# Patient Record
Sex: FEMALE | Race: WHITE | Employment: OTHER | URBAN - METROPOLITAN AREA
[De-identification: names, ages, dates, MRNs, and addresses within clinical notes are randomized per-mention and may not be internally consistent; named-entity substitution may affect disease eponyms.]

---

## 2019-01-18 ENCOUNTER — HOSPITAL ENCOUNTER (INPATIENT)
Age: 84
LOS: 3 days | Discharge: NURSING, REHAB OR LONG-TERM CARE | DRG: 065 | End: 2019-01-21
Attending: EMERGENCY MEDICINE | Admitting: INTERNAL MEDICINE
Payer: MEDICARE

## 2019-01-18 DIAGNOSIS — I63.9 CEREBROVASCULAR ACCIDENT (CVA), UNSPECIFIED MECHANISM (HCC): Primary | ICD-10-CM

## 2019-01-18 DIAGNOSIS — I63.9 OCCIPITAL STROKE (HCC): ICD-10-CM

## 2019-01-18 DIAGNOSIS — H53.462 QUADRANTANOPIA OF LEFT EYE: ICD-10-CM

## 2019-01-18 PROBLEM — I50.9 CHF (CONGESTIVE HEART FAILURE) (HCC): Status: ACTIVE | Noted: 2019-01-18

## 2019-01-18 PROCEDURE — 99284 EMERGENCY DEPT VISIT MOD MDM: CPT

## 2019-01-18 PROCEDURE — 74011250637 HC RX REV CODE- 250/637: Performed by: EMERGENCY MEDICINE

## 2019-01-18 PROCEDURE — 65660000000 HC RM CCU STEPDOWN

## 2019-01-18 RX ORDER — CARVEDILOL 3.12 MG/1
3.12 TABLET ORAL 2 TIMES DAILY WITH MEALS
COMMUNITY

## 2019-01-18 RX ORDER — FUROSEMIDE 20 MG/1
20 TABLET ORAL DAILY
COMMUNITY

## 2019-01-18 RX ORDER — ASPIRIN 81 MG/1
81 TABLET ORAL DAILY
COMMUNITY
End: 2019-01-21

## 2019-01-18 RX ORDER — LISINOPRIL 2.5 MG/1
2.5 TABLET ORAL DAILY
COMMUNITY
End: 2019-01-21

## 2019-01-18 RX ORDER — ASPIRIN 325 MG
325 TABLET ORAL ONCE
Status: COMPLETED | OUTPATIENT
Start: 2019-01-18 | End: 2019-01-18

## 2019-01-18 RX ADMIN — ASPIRIN 325 MG: 325 TABLET, COATED ORAL at 22:24

## 2019-01-19 ENCOUNTER — APPOINTMENT (OUTPATIENT)
Dept: MRI IMAGING | Age: 84
DRG: 065 | End: 2019-01-19
Attending: INTERNAL MEDICINE
Payer: MEDICARE

## 2019-01-19 LAB
ANION GAP SERPL CALC-SCNC: 10 MMOL/L (ref 5–15)
BASOPHILS # BLD: 0 K/UL (ref 0–0.1)
BASOPHILS NFR BLD: 1 % (ref 0–1)
BUN SERPL-MCNC: 14 MG/DL (ref 6–20)
BUN/CREAT SERPL: 17 (ref 12–20)
CALCIUM SERPL-MCNC: 8.9 MG/DL (ref 8.5–10.1)
CHLORIDE SERPL-SCNC: 108 MMOL/L (ref 97–108)
CHOLEST SERPL-MCNC: 136 MG/DL
CO2 SERPL-SCNC: 25 MMOL/L (ref 21–32)
CREAT SERPL-MCNC: 0.81 MG/DL (ref 0.55–1.02)
DIFFERENTIAL METHOD BLD: ABNORMAL
EOSINOPHIL # BLD: 0.2 K/UL (ref 0–0.4)
EOSINOPHIL NFR BLD: 4 % (ref 0–7)
ERYTHROCYTE [DISTWIDTH] IN BLOOD BY AUTOMATED COUNT: 14.2 % (ref 11.5–14.5)
EST. AVERAGE GLUCOSE BLD GHB EST-MCNC: NORMAL MG/DL
GLUCOSE SERPL-MCNC: 93 MG/DL (ref 65–100)
HBA1C MFR BLD: 4.8 % (ref 4.2–6.3)
HCT VFR BLD AUTO: 36.5 % (ref 35–47)
HDLC SERPL-MCNC: 50 MG/DL
HDLC SERPL: 2.7 {RATIO} (ref 0–5)
HGB BLD-MCNC: 11.9 G/DL (ref 11.5–16)
IMM GRANULOCYTES # BLD AUTO: 0 K/UL (ref 0–0.04)
IMM GRANULOCYTES NFR BLD AUTO: 0 % (ref 0–0.5)
LDLC SERPL CALC-MCNC: 72.6 MG/DL (ref 0–100)
LIPID PROFILE,FLP: NORMAL
LYMPHOCYTES # BLD: 0.5 K/UL (ref 0.8–3.5)
LYMPHOCYTES NFR BLD: 11 % (ref 12–49)
MCH RBC QN AUTO: 31.3 PG (ref 26–34)
MCHC RBC AUTO-ENTMCNC: 32.6 G/DL (ref 30–36.5)
MCV RBC AUTO: 96.1 FL (ref 80–99)
MONOCYTES # BLD: 0.5 K/UL (ref 0–1)
MONOCYTES NFR BLD: 11 % (ref 5–13)
NEUTS SEG # BLD: 3.3 K/UL (ref 1.8–8)
NEUTS SEG NFR BLD: 73 % (ref 32–75)
NRBC # BLD: 0 K/UL (ref 0–0.01)
NRBC BLD-RTO: 0 PER 100 WBC
PLATELET # BLD AUTO: 121 K/UL (ref 150–400)
PMV BLD AUTO: 10.1 FL (ref 8.9–12.9)
POTASSIUM SERPL-SCNC: 4.1 MMOL/L (ref 3.5–5.1)
RBC # BLD AUTO: 3.8 M/UL (ref 3.8–5.2)
RBC MORPH BLD: ABNORMAL
SODIUM SERPL-SCNC: 143 MMOL/L (ref 136–145)
TRIGL SERPL-MCNC: 67 MG/DL (ref ?–150)
VLDLC SERPL CALC-MCNC: 13.4 MG/DL
WBC # BLD AUTO: 4.5 K/UL (ref 3.6–11)

## 2019-01-19 PROCEDURE — 97161 PT EVAL LOW COMPLEX 20 MIN: CPT

## 2019-01-19 PROCEDURE — 74011250636 HC RX REV CODE- 250/636: Performed by: INTERNAL MEDICINE

## 2019-01-19 PROCEDURE — 65660000000 HC RM CCU STEPDOWN

## 2019-01-19 PROCEDURE — 97535 SELF CARE MNGMENT TRAINING: CPT

## 2019-01-19 PROCEDURE — 70551 MRI BRAIN STEM W/O DYE: CPT

## 2019-01-19 PROCEDURE — 74011250637 HC RX REV CODE- 250/637: Performed by: INTERNAL MEDICINE

## 2019-01-19 PROCEDURE — 93880 EXTRACRANIAL BILAT STUDY: CPT

## 2019-01-19 PROCEDURE — 80048 BASIC METABOLIC PNL TOTAL CA: CPT

## 2019-01-19 PROCEDURE — 36415 COLL VENOUS BLD VENIPUNCTURE: CPT

## 2019-01-19 PROCEDURE — 70544 MR ANGIOGRAPHY HEAD W/O DYE: CPT

## 2019-01-19 PROCEDURE — 85025 COMPLETE CBC W/AUTO DIFF WBC: CPT

## 2019-01-19 PROCEDURE — 97112 NEUROMUSCULAR REEDUCATION: CPT

## 2019-01-19 PROCEDURE — 80061 LIPID PANEL: CPT

## 2019-01-19 PROCEDURE — 83036 HEMOGLOBIN GLYCOSYLATED A1C: CPT

## 2019-01-19 PROCEDURE — 97165 OT EVAL LOW COMPLEX 30 MIN: CPT

## 2019-01-19 RX ORDER — CARVEDILOL 3.12 MG/1
3.12 TABLET ORAL 2 TIMES DAILY WITH MEALS
Status: DISCONTINUED | OUTPATIENT
Start: 2019-01-19 | End: 2019-01-21 | Stop reason: HOSPADM

## 2019-01-19 RX ORDER — ENOXAPARIN SODIUM 100 MG/ML
40 INJECTION SUBCUTANEOUS EVERY 24 HOURS
Status: DISCONTINUED | OUTPATIENT
Start: 2019-01-19 | End: 2019-01-21 | Stop reason: HOSPADM

## 2019-01-19 RX ORDER — SODIUM CHLORIDE 0.9 % (FLUSH) 0.9 %
5-40 SYRINGE (ML) INJECTION EVERY 8 HOURS
Status: DISCONTINUED | OUTPATIENT
Start: 2019-01-19 | End: 2019-01-21 | Stop reason: HOSPADM

## 2019-01-19 RX ORDER — SODIUM CHLORIDE 0.9 % (FLUSH) 0.9 %
5-40 SYRINGE (ML) INJECTION AS NEEDED
Status: DISCONTINUED | OUTPATIENT
Start: 2019-01-19 | End: 2019-01-21 | Stop reason: HOSPADM

## 2019-01-19 RX ORDER — ASPIRIN 325 MG
325 TABLET ORAL DAILY
Status: DISCONTINUED | OUTPATIENT
Start: 2019-01-19 | End: 2019-01-20

## 2019-01-19 RX ORDER — ATORVASTATIN CALCIUM 10 MG/1
20 TABLET, FILM COATED ORAL
Status: DISCONTINUED | OUTPATIENT
Start: 2019-01-19 | End: 2019-01-21 | Stop reason: HOSPADM

## 2019-01-19 RX ORDER — FUROSEMIDE 20 MG/1
20 TABLET ORAL DAILY
Status: DISCONTINUED | OUTPATIENT
Start: 2019-01-19 | End: 2019-01-21 | Stop reason: HOSPADM

## 2019-01-19 RX ORDER — LISINOPRIL 5 MG/1
2.5 TABLET ORAL DAILY
Status: DISCONTINUED | OUTPATIENT
Start: 2019-01-19 | End: 2019-01-20

## 2019-01-19 RX ORDER — ACETAMINOPHEN 650 MG/1
650 SUPPOSITORY RECTAL
Status: DISCONTINUED | OUTPATIENT
Start: 2019-01-19 | End: 2019-01-19

## 2019-01-19 RX ORDER — ACETAMINOPHEN 325 MG/1
650 TABLET ORAL
Status: DISCONTINUED | OUTPATIENT
Start: 2019-01-19 | End: 2019-01-21 | Stop reason: HOSPADM

## 2019-01-19 RX ADMIN — Medication 10 ML: at 20:56

## 2019-01-19 RX ADMIN — FUROSEMIDE 20 MG: 40 TABLET ORAL at 09:14

## 2019-01-19 RX ADMIN — ACETAMINOPHEN 650 MG: 325 TABLET ORAL at 23:32

## 2019-01-19 RX ADMIN — CARVEDILOL 3.12 MG: 6.25 TABLET, FILM COATED ORAL at 16:38

## 2019-01-19 RX ADMIN — ASPIRIN 325 MG: 325 TABLET, COATED ORAL at 09:13

## 2019-01-19 RX ADMIN — ATORVASTATIN CALCIUM 20 MG: 10 TABLET, FILM COATED ORAL at 20:53

## 2019-01-19 RX ADMIN — LISINOPRIL 2.5 MG: 5 TABLET ORAL at 09:13

## 2019-01-19 RX ADMIN — CARVEDILOL 3.12 MG: 6.25 TABLET, FILM COATED ORAL at 09:14

## 2019-01-19 RX ADMIN — ENOXAPARIN SODIUM 40 MG: 40 INJECTION SUBCUTANEOUS at 09:15

## 2019-01-19 NOTE — PROGRESS NOTES
Bedside shift change report given to Ferny Vines RN (oncoming nurse) by Emma Hart RN (offgoing nurse). Report included the following information SBAR, Kardex and MAR.

## 2019-01-19 NOTE — PROGRESS NOTES
Problem: Self Care Deficits Care Plan (Adult) Goal: *Acute Goals and Plan of Care (Insert Text) Occupational Therapy Goals Initiated 1/19/2019 1. Patient will perform grooming standing at the sink with supervision/set-up within 7 day(s). 2.  Patient will perform self-feeding using the left hand as a fine assist to open containers with modified independence within 7 day(s). 3.  Patient will perform lower body dressing with modified independence within 7 day(s). 4.  Patient will perform toilet transfers with supervision/set-up within 7 day(s). 5.  Patient will perform all aspects of toileting with modified independence within 7 day(s). 6.  Patient will participate in upper extremity therapeutic exercise/activities with supervision/set-up within 7 day(s). 7.  Patient will utilize energy conservation techniques during functional activities with verbal cues within 7 day(s). Occupational Therapy EVALUATION Patient: Trey Ferrer (80 y.o. female) Date: 1/19/2019 Primary Diagnosis: CVA (cerebral vascular accident) ([facility] CVA (cerebral vascular accident) ([facility] Precautions:   Fall ASSESSMENT : 
Based on the objective data described below, the patient presents with L UE dysmetria, decreased L UE proprioception, impaired L UE motor planning and poor standing balance with ataxic gait pattern creating high fall risk during functional transfers and standing ADLs. Pt is functioning below her baseline (pt IND at home; still driving; swims and works out at [employer]; active in [employer]) and now requires up to minimal assistance for standing ADLs, functional transfers and use of the L hand during ADLs and FM tasks. Pt is highly motivated to increase her function and will be an excellent IP rehab candidate. . 
 
Patient will benefit from skilled intervention to address the above impairments. Patients rehabilitation potential is considered to be Excellent Factors which may influence rehabilitation potential include:  
[x]                None noted []                Mental ability/status []                Medical condition []                Home/family situation and support systems []                Safety awareness []                Pain tolerance/management 
[]                Other: PLAN : 
Recommendations and Planned Interventions: 
[x]                  Self Care Training                  [x]           Therapeutic Activities [x]                  Functional Mobility Training    []           Cognitive Retraining 
[x]                  Therapeutic Exercises           []           Endurance Activities [x]                  Balance Training                   [x]           Neuromuscular Re-Education []                  Visual/Perceptual Training     [x]      Home Safety Training 
[x]                  Patient Education                 [x]           Family Training/Education []                  Other (comment): Frequency/Duration: Patient will be followed by occupational therapy 5 times a week to address goals. Discharge Recommendations: Rehab Further Equipment Recommendations for Discharge: None SUBJECTIVE:  
Patient stated Wilton thayer for your help. Tawanna Silva OBJECTIVE DATA SUMMARY:  
HISTORY:  
History reviewed. No pertinent past medical history. History reviewed. No pertinent surgical history. Prior Level of Function/Environment/Context: Pt lives in a 2 story home with first floor setup but is not restricted from going up the stairs at baseline. She is IND with ADLs and mobility, drives, active in [employer] and goes to the gym for swim classes 3 times per week and aerobics 2  Per week. Has an extensive and close knit \"[employer] family\" who can provide assistance at home.    
Occupations in which the patient is/was successful, what are the barriers preventing that success:  
Performance Patterns (routines, roles, habits, and rituals):  
 Personal Interests and/or values:  
Expanded or extensive additional review of patient history:  
 
Home Situation Home Environment: Private residence # Steps to Enter: 1 Rails to Enter: Yes One/Two Story Residence: Two story, live on 1st floor Living Alone: Yes Support Systems: Friends \ neighbors Patient Expects to be Discharged to[de-identified] Rehabilitation facility Current DME Used/Available at Home: Cane, straight Tub or Shower Type: Shower(grab bar;  ) Hand dominance: RightEXAMINATION OF PERFORMANCE DEFICITS: 
Cognitive/Behavioral Status: 
Neurologic State: Alert; Appropriate for age Orientation Level: Appropriate for age Cognition: Appropriate decision making; Appropriate for age attention/concentration; Appropriate safety awareness; Follows commands Perception: Appears intact Safety/Judgement: Insight into deficits Skin: intact Edema: None noted Hearing: Auditory Auditory Impairment: Hard of hearing, bilateral 
Vision/Perceptual:   
       
Acuity: Able to read clock/calendar on wall without difficulty Corrective Lenses: Glasses Range of Motion: 
AROM: Generally decreased, functional(R UE limited at baseline; L UE WNL) Strength: 
Strength: Generally decreased, functional 
  
 
Coordination: 
Coordination: Generally decreased, functional 
Fine Motor Skills-Upper: Left Impaired;Right Intact Gross Motor Skills-Upper: Left Impaired;Right Intact Tone & Sensation: 
Tone: Normal 
Sensation: Intact Balance: 
Sitting: Intact Standing: Impaired Standing - Static: Fair Standing - Dynamic : Poor Functional Mobility and Transfers for ADLs:Bed Mobility: 
Supine to Sit: Supervision Transfers: 
Sit to Stand: Minimum assistance Functional Transfers Toilet Transfer : Minimum assistance Adaptive Equipment: Bedside commode Bed to Chair: Minimum assistance ADL Assessment: 
Feeding: Setup Oral Facial Hygiene/Grooming: Setup Bathing: Minimum assistance Upper Body Dressing: Minimum assistance Lower Body Dressing: Minimum assistance Toileting: Minimum assistance ADL Intervention and task modifications: 
  
Grooming Washing Hands: Supervision/set-up Lower Body Dressing Assistance Socks: Supervision/set-up Leg Crossed Method Used: Yes Position Performed: Seated edge of bed Toileting Toileting Assistance: Minimum assistance Bladder Hygiene: Minimum assistance Cognitive Retraining Safety/Judgement: Insight into deficits Functional Measure:  
Fugl-Bustos Assessment of Motor Recovery after Stroke:  
 
Reflex Activity Flexors/Biceps/Fingers: Can be elicited Extensors/Triceps: Can be elicited Reflex Subtotal: 4 Volitional Movement Within Synergies Shoulder Retraction: Full Shoulder Elevation: Full Shoulder Abduction (90 degrees): Full Shoulder External Rotation: Full Elbow Flexion: Full Forearm Supination: Full Shoulder Adduction/Internal Rotation: Full Elbow Extension: Full Forearm Pronation: Full Subtotal: 18 
 
Volitional Movement Mixing Synergies Hand to Lumbar Spine: Full Shoulder Flexion (0-90 degrees): Full Pronation-Supination: Full Subtotal: 6 Volitional Movement With Little or No Synergy Shoulder Abduction (0-90 degrees): Full Shoulder Flexion ( degrees): Full Pronation/Supination: Full Subtotal : 6 Normal Reflex Activity Biceps, Triceps, Finger Flexors: Full Subtotal : 2 Upper Extremity Total  
Upper Extremity Total: 36 Wrist 
Stability at 15 Degree Dorsiflexion: Full Repeated Dorsiflexion/ Volar Flexion: Full Stability at 15 Degree Dorsiflexion: Full Repeated Dorsiflexion/ Volar Flexion: Full Circumduction: Partial 
Wrist Total: 9 Hand Mass Flexion: Full Mass Extension: Full Grasp A: Full Grasp B: Full Grasp C: Full Grasp D: Full Grasp E: Full Hand Total: 14 
 
Coordination/Speed Tremor: None Dysmetria: Slight Time: <1s Coordination/Speed Total : 5 Total A-D 
 Total A-D (Motor Function): 93/38 This is a reliable/valid measure of arm function after a neurological event. It has established value to characterize functional status and for measuring spontaneous and therapy-induced recovery; tests proximal and distal motor functions. Fugl-Bustos Assessment  UE scores recorded between five and 30 days post neurologic event can be used to predict UE recovery at six months post neurologic event. Severe = 0-21 points Moderately Severe = 22-33 points Moderate = 34-47 points Mild = 48-66 points AINSLEY Beasley, HIRAM Terrazas, & HOLDEN Turner (1992). Measurement of motor recovery after stroke: Outcome assessment and sample size requirements. Stroke, 23, pp. 6267-0286.  
------------------------------------------------------------------------------------------------------------------------------------------------------------------MCID: 
Stroke: Ceferino Lugo et al, 2001; n = 171; mean age 79 (6) years; assessed within 16 (12) days of stroke, Acute Stroke) FMA Motor Scores from Admission to Discharge 10 point increase in FMA Upper Extremity = 1.5 change in discharge FIM  
10 point increase in FMA Lower Extremity = 1.9 change in discharge FIM 
MDC:  
Stroke:  
Vanessa Ray et al, 2008, n = 14, mean age = 59.9 (14.6) years, assessed on average 14 (6.5) months post stroke, Chronic Stroke) FMA = 5.2 points for the Upper Extremity portion of the assessment Occupational Therapy Evaluation Charge Determination History Examination Decision-Making LOW Complexity : Brief history review  LOW Complexity : 1-3 performance deficits relating to physical, cognitive , or psychosocial skils that result in activity limitations and / or participation restrictions  LOW Complexity : No comorbidities that affect functional and no verbal or physical assistance needed to complete eval tasks Based on the above components, the patient evaluation is determined to be of the following complexity level: LOW Pain: 
Pain Scale 1: Numeric (0 - 10) Pain Intensity 1: 0 Activity Tolerance:  
No s/s of distress Please refer to the flowsheet for vital signs taken during this treatment. After treatment:  
[x]  Patient left in no apparent distress sitting up in chair 
[]  Patient left in no apparent distress in bed 
[x]  Call bell left within reach [x]  Nursing notified 
[]  Caregiver present 
[]  Bed alarm activated COMMUNICATION/EDUCATION:  
The patients plan of care was discussed with: Physical Therapist and Registered Nurse. Patient was educated regarding her deficit(s) of L dysmetria as this relates to her diagnosis of CVA. She demonstrated Excellent understanding as evidenced by verbalization. Patient and/or family was verbally educated on the BE FAST acronym for signs/symptoms of CVA and TIA. Informed patient to refer to the Stroke Binder for further BE FAST information. All questions answered with patient indicating good understanding. []    Home safety education was provided and the patient/caregiver indicated understanding. [x]    Patient/family have participated as able in goal setting and plan of care. [x]    Patient/family agree to work toward stated goals and plan of care. []    Patient understands intent and goals of therapy, but is neutral about his/her participation. []    Patient is unable to participate in goal setting and plan of care. This patients plan of care is appropriate for delegation to SAV. Thank you for this referral. 
Edgard Hogan OT Time Calculation: 38 mins

## 2019-01-19 NOTE — PROGRESS NOTES
Bedside shift change report given to Pranay Oliva RN (oncoming nurse) by Kinjal Orozco RN (offgoing nurse). Report included the following information SBAR, Kardex and MAR.

## 2019-01-19 NOTE — PROCEDURES
[facility] [address]  *** FINAL REPORT ***    Name: Antolin Queen  MRN: [id number]  : 1929  HIS Order #: [id number]  [address] [facility] Visit #: [id number]  Date: 2019    TYPE OF TEST: Cerebrovascular Duplex    REASON FOR TEST  Transient ischemic attacks    Right Carotid:-             Proximal               Mid                 Distal  cm/s  Systolic  Diastolic  Systolic  Diastolic  Systolic  Diastolic  CCA:     03.6      17.0                            71.0      17.0  Bulb:  ECA:     49.0      10.0  ICA:     49.0       8.0       42.0      13.0       45.0      10.0  ICA/CCA:  0.7       0.5    ICA Stenosis: <50%    Right Vertebral:-  Finding: Antegrade  Sys:       34.0  Karley:        9.0    Right Subclavian: Normal    Left Carotid:-            Proximal                Mid                 Distal  cm/s  Systolic  Diastolic  Systolic  Diastolic  Systolic  Diastolic  CCA:     99.4      15.0                            53.0      14.0  Bulb:  ECA:     51.0      11.0  ICA:     45.0       9.0       51.0      11.0       38.0      10.0  ICA/CCA:  0.8       0.6    ICA Stenosis: <50%    Left Vertebral:-  Finding: Antegrade  Sys:       35.0  Karley:       12.0    Left Subclavian: Normal    INTERPRETATION/FINDINGS  PROCEDURE:  Evaluation of the extracranial cerebrovascular arteries  with ultrasound (B-mode imaging, pulsed Doppler, color Doppler). Includes the common carotid, internal carotid, external carotid, and  vertebral arteries. FINDINGS: Heterogenous plaque visualized right CCA/ICA. Moderate  Calcified Plaque visualized left CCA/ICA, unable to obtain high  velocities. IMPRESSION: Findings are consistent with 0-50% stenosis of the right  internal carotid and 0-50% stenosis of the left internal carotid. Vertebrals are patent with antegrade flow. ADDITIONAL COMMENTS    I have personally reviewed the data relevant to the interpretation of  this  study.     TECHNOLOGIST: Margarita Andrews  Signed: 2019 08:07 AM    PHYSICIAN: Rosangela Cunningham.  Bg Carr MD  Signed: 01/21/2019 09:59 AM

## 2019-01-19 NOTE — H&P
[address] 
[phone] Admission History and Physical 
 
 
NAME:  Dallas Mayen :   1929 MRN:  [id number] PCP:  No primary care provider on file. Date/Time:  2019 Subjective: CHIEF COMPLAINT: LT side weakness, blurry vision HISTORY OF PRESENT ILLNESS:    
Ms. Nolan Mayen is a 80 y.o.  female who is admitted with CVA. Ms. Nolan Mayen with PMH of CHF was sent from [facility] due to CVA. [facility] and when she parked her car and when she started to walk towards the restaurant, she dropped her pocket book and later, she collapsed, but didn't loss consciousness. She noted to be weak on the LT side with blurry vision and facial droop. At the hospital, had CT and CTA of the head and offered tPA, but pt refused. As they done't have neurologist at the hospital, she was sent to [facility] ER No past medical history on file. No past surgical history on file. Social History Tobacco Use  Smoking status: Not on file Substance Use Topics  Alcohol use: Not on file Family History Problem Relation Age of Onset  Heart Disease Sister Allergies Allergen Reactions  Erythromycin Unknown (comments)  Sulfa (Sulfonamide Antibiotics) Unknown (comments) Prior to Admission medications Not on File Review of Systems: 
(bold if positive, if negative) Gen:  Eyes:  ENT:  CVS:  Pulm:  GI:   
:   
MS:  Skin:  Psych:  Endo:   
Hem:  Renal:   
Neuro:  weakness,  
 
 
  
Objective: VITALS:   
Vital signs reviewed; most recent are: 
 
Visit Vitals Ht 5' 7\" (1.702 m) Wt 77.1 kg (170 lb) SpO2 93% BMI 26.63 kg/m² SpO2 Readings from Last 6 Encounters:  
19 93% No intake or output data in the 24 hours ending 19 2300 Exam:  
 
Physical Exam: Gen:  Well-developed, well-nourished, in no acute distress HEENT:  Pink conjunctivae, PERRL, hearing intact to voice, moist mucous membranes Neck:  Supple, without masses, thyroid non-tender Resp:  No accessory muscle use, clear breath sounds without wheezes rales or rhonchi 
Card:  No murmurs, normal S1, S2 without thrills, bruits or peripheral edema Abd:  Soft, non-tender, non-distended, normoactive bowel sounds are present Lymph:  No cervical adenopathy Musc:  No cyanosis or clubbing Skin:  No rashes or ulcers, skin turgor is good Neuro:  Cranial nerves 3-12 are grossly intact,  strength is 5/5 bilaterally, dorsi / plantarflexion strength is 5/5 bilaterally, follows commands appropriately Psych:  Alert with good insight. Oriented to person, place, and time Labs: 
 
No results for input(s): WBC, HGB, HCT, PLT, HGBEXT, HCTEXT, PLTEXT in the last 72 hours. No results for input(s): NA, K, CL, CO2, GLU, BUN, CREA, CA, MG, PHOS, ALB, TBIL, TBILI, SGOT, ALT in the last 72 hours. No results found for: Servando Pontiff No results for input(s): PH, PCO2, PO2, HCO3, FIO2 in the last 72 hours. No results for input(s): INR in the last 72 hours. No lab exists for component: INREXT Chest Xray:    
EKG reviewed:   
 
  
Assessment/Plan: 1. [facility] (1/18/2019). CT and CTA of the head at [facility] is unremarkable. Check MRI/ MRA of the brain, BL carotid doppler, echocardiogram. start ASA, neuro check and consult neurology, PT/OT 2. CHF (congestive heart failure) ([facility] (1/18/2019), compensated. recently diagnosed. Unknown EF. Continue coreg, ACEi and lasix. Check echocardiogram.  
 
  
 
 
Medical records reviewed in preparation for this admission: Old medical records. Surrogate decision maker:  patient and son Total time spent with patient: 50 Minutes Care Plan discussed with: Patient, Family, Nursing Staff and >50% of time spent in counseling and coordination of care Discussed:  Care Plan Prophylaxis:  Lovenox Probable Disposition:  Home w/Family 
        
___________________________________________________ Attending Physician: Tiffanie Proctor MD

## 2019-01-19 NOTE — ED NOTES
.Bedside and Verbal shift change report given to Francesca (oncoming nurse) by Sukumar Aleman (offgoing nurse). Report included the following information SBAR, ED Summary, MAR and Recent Results.

## 2019-01-19 NOTE — PROGRESS NOTES
BS[facility]SI: MED RECONCILIATION Information obtained from: patient, patient's family, medication bottles from [facility] pharmacy Significant PMH/Disease States: History reviewed. No pertinent past medical history. Chief Complaint for this Admission: Chief Complaint Patient presents with  Extremity Weakness Allergies: Erythromycin and Sulfa (sulfonamide antibiotics) Prior to Admission Medications:  
 
Medication Documentation Review Audit Reviewed by ELAINE OsorioD (Pharmacist) on 01/18/19 at 0385 [id number] Medication Sig Documenting Provider Last Dose Status Taking?  
aspirin delayed-release 81 mg tablet Take 81 mg by mouth daily. Provider, Historical 1/18/2019 am Active Yes  
carvedilol (COREG) 3.125 mg tablet Take 3.125 mg by mouth two (2) times daily (with meals). Provider, Historical 1/18/2019 afternoon Active Yes  
furosemide (LASIX) 20 mg tablet Take 20 mg by mouth daily. Provider, Historical 1/18/2019 am Active Yes  
lisinopril (PRINIVIL, ZESTRIL) 2.5 mg tablet Take 2.5 mg by mouth daily. Provider, Historical 1/18/2019 am Active Yes Thank you for the consult, 
Dakotah Echeverria

## 2019-01-19 NOTE — ED PROVIDER NOTES
80 y.o. female with past medical history significant for HTN and CHF who presents via EMS as a transfer from [facility] for admission s/p stroke. Pt states she was at a restaurant earlier today, when she dropped her purse on the ground. She reports leaning over to pick it up and subsequently \"slumped to the ground\" [phone] this afternoon, pt denies falling. Per family at bedside, bystanders noted the pt to have \"fallen in slow motion. \" Upon EMS arrival, pt was noted to have left sided extremity weakness and slurred speech. Pt was transported for further care at [facility]. Pt underwent unremarkable neck and head Ct and a CTA negative for large vessel occulusion. Discussed treatment with TPA at [facility], but decided against it. Family additionally notes the pt was recently discharged last week after being admitted for acute CHF. Pt denies any current anticoagulation or hx of Atrial fibrillation. Family at bedside note the pt's sxs have mildly improved since transfer. She denies any other complaints at this time. There are no other acute medical concerns at this time. Social Hx: denies tobacco use, denies EtOH use, denies Illicit Drug use PCP: No primary care provider on file. Note written by Solitario Morris, as dictated by Jhonny Acevedo MD 10:03 PM 
 
 
 
 
The history is provided by the patient (Family and medical records). No  was used. No past medical history on file. No past surgical history on file. No family history on file. Social History Socioeconomic History  Marital status: Not on file Spouse name: Not on file  Number of children: Not on file  Years of education: Not on file  Highest education level: Not on file Social Needs  Financial resource strain: Not on file  Food insecurity - worry: Not on file  Food insecurity - inability: Not on file  Transportation needs - medical: Not on file  Transportation needs - non-medical: Not on file Occupational History  Not on file Tobacco Use  Smoking status: Not on file Substance and Sexual Activity  Alcohol use: Not on file  Drug use: Not on file  Sexual activity: Not on file Other Topics Concern  Not on file Social History Narrative  Not on file ALLERGIES: Erythromycin and Sulfa (sulfonamide antibiotics) Review of Systems Neurological: Positive for weakness. All other systems reviewed and are negative. Vitals:  
 01/18/19 2159 01/18/19 2207 SpO2:  93% Weight: 77.1 kg (170 lb) Height: 5' 7\" (1.702 m) Physical Exam  
Constitutional: She is oriented to person, place, and time. She appears well-developed and well-nourished. No distress. HENT:  
Head: Normocephalic and atraumatic. Eyes: Conjunctivae are normal. No scleral icterus. Neck: Neck supple. No tracheal deviation present. Cardiovascular: Normal rate, regular rhythm, normal heart sounds and intact distal pulses. Exam reveals no gallop and no friction rub. No murmur heard. Pulmonary/Chest: Effort normal and breath sounds normal. She has no wheezes. She has no rales. Abdominal: Soft. She exhibits no distension. There is no tenderness. There is no rebound and no guarding. Musculoskeletal: She exhibits no edema. Neurological: She is alert and oriented to person, place, and time. Left pronator drift, decreased motor strength LUE compared to RUE, normal strength lower extremities BL. Normal sensation extremities. Left sided visual field deficit. Skin: Skin is warm and dry. No rash noted. Psychiatric: She has a normal mood and affect. Nursing note and vitals reviewed. Note written by Solitario Hollis, as dictated by Blanca Guy MD 10:03 PM 
 
MDM Procedures CONSULT NOTE: 
10:13 PM Blanca Guy MD spoke with Dr. Mi Forman, Consult for Hospitalist.  Discussed available diagnostic tests and clinical findings. Dr. Franklin Ponce will admit. A/P: transfer from [facility] for CVA with left-sided weakness and visual field deficit; admit for further management.  Sobeida Forrest MD 
10:54 PM

## 2019-01-19 NOTE — ED TRIAGE NOTES
Patient arrives via EMS from [facility]. Patient was out to dinner at 5pm and had left sided weakness. CT at [place] was negative, NIH of  6, patient denied TPA at [facility] due to history of bleeding.

## 2019-01-19 NOTE — PROGRESS NOTES
TRANSFER - IN REPORT: 
 
Verbal report received from Mary Gillis RN(name) on Mable AINSLEY Minus Liner  being received from ED (unit) for routine progression of care Report consisted of patients Situation, Background, Assessment and  
Recommendations(SBAR). Information from the following report(s) SBAR, Kardex, ED Summary, Intake/Output, MAR, Accordion and Recent Results was reviewed with the receiving nurse. Opportunity for questions and clarification was provided. Assessment completed upon patients arrival to unit and care assumed.

## 2019-01-19 NOTE — PROGRESS NOTES
Problem: Mobility Impaired (Adult and Pediatric) Goal: *Acute Goals and Plan of Care (Insert Text) Physical Therapy Goals Initiated 1/19/2019 1. Patient will move from supine to sit and sit to supine  in bed with independence within 7 day(s). 2.  Patient will transfer from bed to chair and chair to bed with supervision/set-up using the least restrictive device within 7 day(s). 3.  Patient will perform sit to stand with modified independence within 7 day(s). 4.  Patient will ambulate with supervision/set-up for 100 feet with the least restrictive device within 7 day(s). 5.  Patient will ascend/descend 2 stairs with 1 handrail(s) with minimal assistance/contact guard assist within 7 day(s). 6.  Patient will improve Greenberg Balance score by 7 points within 7 days. physical Therapy EVALUATION- neuro population Patient: Lety Santamaria (80 y.o. female) Date: 1/19/2019 Primary Diagnosis: CVA (cerebral vascular accident) ([facility] [address] CVA (cerebral vascular accident) ([facility] [address] Precautions:   Fall ASSESSMENT : 
Based on the objective data described below, the patient presents with slight left sided weakness as compared to right side, left side inattention, decreased dynamic balance, decreased left side coordination,  impulsivity, and high risk for falls following admission for CVA. Patient was out with friends last night for dinner when she dropped her purse and then lowered to the ground when trying to pick it up. She was taken to [facility] where CT was negative for acute process. She denied TPA due to history of bleeding. Patient has been transferred to [facility] for continued stroke workup. Awaiting MRI at this time. Patient was received in bedside chair in ER while waiting for bed assignment. She was alert, oriented and talking without difficulty, though she is hard of hearing (baseline). She did not have her hearing aids in at this time. PT educated patient regarding signs and symptoms of stroke and BEFAST pamphlet provided. She expressed understanding. She demonstrates slight left sided weakness and inattention but functional overall. Patient stood and ambulated 20 feet with and without rolling walker and min/mod assist of 2. She demonstrated left foot decreased clearance, short steps, ataxic, slight circumduction and decreased arm swing. She scores 17/56 on Greenberg test for balance and is at high risk for falls. PT assisted patient to wheelchair where she is now going for MRI. Patient lives alone in a two story home and still drives. She admits to using a cane for last few months but only as needed. She is not safe to return to this setting. She is a good candidate for INPATIENT REHAB setting. Recommend assistance at all times with transfers and gait for safety. Son arrived at end of PT session. PT also educated him regarding BEFAST and regarding patients need for rehab. They are both in agreement. Patient will benefit from skilled intervention to address the above impairments. Patients rehabilitation potential is considered to be Good Factors which may influence rehabilitation potential include:  
[]           None noted 
[]           Mental ability/status [x]           Medical condition 
[x]           Home/family situation and support systems 
[x]           Safety awareness 
[]           Pain tolerance/management 
[]           Other: PLAN : 
Recommendations and Planned Interventions: 
[x]             Bed Mobility Training             []      Neuromuscular Re-Education [x]             Transfer Training                   []      Orthotic/Prosthetic Training 
[x]             Gait Training                         []      Modalities [x]             Therapeutic Exercises           []      Edema Management/Control []             Therapeutic Activities            []      Patient and Family Training/Education []             Other (comment): Frequency/Duration: Patient will be followed by physical therapy daily to address goals. Discharge Recommendations: Inpatient Rehab Further Equipment Recommendations for Discharge: none at this time SUBJECTIVE:  
Patient stated I was out to dinner with my friends when all this happened. I think I may need a walker from now on. OBJECTIVE DATA SUMMARY:  
HISTORY:   
History reviewed. No pertinent past medical history. History reviewed. No pertinent surgical history. Prior Level of Function/Home Situation: used a cane as needed. Still drives. Personal factors and/or comorbidities impacting plan of care: High risk for falls; lives alone Home Situation Home Environment: Private residence # Steps to Enter: 1 Rails to Enter: Yes One/Two Story Residence: Two story, live on 1st floor Living Alone: Yes Support Systems: Friends \ neighbors Patient Expects to be Discharged to[de-identified] Rehabilitation facility Current DME Used/Available at Home: Cane, straight EXAMINATION/PRESENTATION/DECISION MAKING:  
Critical Behavior: 
Neurologic State: Alert, Appropriate for age, Eyes open spontaneously Orientation Level: Oriented X4 Cognition: Appropriate decision making, Appropriate for age attention/concentration, Appropriate safety awareness, Follows commands Safety/Judgement: Insight into deficits Hearing: Auditory Auditory Impairment: Hard of hearing, bilateralSkin:  Not fully observed Range Of Motion: 
AROM: Generally decreased, functional(decreased shoulder flexion bilaterally) Strength:   
Strength: Generally decreased, functional(slightly decreased LLE as compared to RLE) LLE generally 4/5 to major muscle groups. Tone & Sensation:  
Tone: Normal 
  
 Proprioception: In tact Sensation: Intact Coordination: 
Coordination: Generally decreased, functional on left as compared with right Vision: Acuity: Able to read clock/calendar on wall without difficulty Corrective Lenses: Glasses Functional Mobility: 
Bed Mobility: 
  
Supine to Sit: Contact guard assistance Transfers: 
Sit to Stand: Minimum assistance Stand to Sit: Minimum assistance Bed to Chair: Minimum assistance Balance:  
Sitting: Intact Standing: Impaired Standing - Static: Constant support Standing - Dynamic : PoorAmbulation/Gait Training:Distance (ft): 20 Feet (ft) Assistive Device: Gait belt;Walker, rolling Ambulation - Level of Assistance: Minimal assistance;Assist x2 Gait Abnormalities: Altered arm swing;Decreased step clearance; Ataxic;Path deviations; Shuffling gait Base of Support: Narrowed Speed/Dalia: Shuffled;Fluctuations Step Length: Right shortened;Left shortened Functional Measure Greenberg Balance Test: 
 
Sitting to Standing: 3 Standing Unsupported: 3 Sitting with Back Unsupported: 4 Standing to Sittin Transfers: 1 Standing Unsupported with Eyes Closed: 3 Standing Unsupported with Feet Together: 1 Reach Forward with Outstretched Arm: 1  Object: 0 Turn to Look Over Shoulders: 1 Turn 360 Degrees: 0 Alternate Foot on Step/Stool: 0 Standing Unsupported One Foot in Front: 0 Stand on One Le Total: 17 
 
 
 
56=Maximum possible score;  
0-20=High fall risk 21-40=Moderate fall risk 41-56=Low fall risk Greenberg Balance Test and G-code impairment scale: 
Percentage of Impairment CH 
 
0% 
 CI 
 
1-19% CJ 
 
20-39% CK 
 
40-59% CL 
 
60-79% CM 
 
80-99% CN  
 
100% Stefania Canas Score 0-56 56 45-55 34-44 23-33 12-22 1-11 0 Physical Therapy Evaluation Charge Determination History Examination Presentation Decision-Making MEDIUM  Complexity : 1-2 comorbidities / personal factors will impact the outcome/ POC  LOW Complexity : 1-2 Standardized tests and measures addressing body structure, function, activity limitation and / or participation in recreation  LOW Complexity : Stable, uncomplicated  Other outcome measures Greenberg  HIGH Based on the above components, the patient evaluation is determined to be of the following complexity level: LOW Pain: 
Pain Scale 1: Numeric (0 - 10) Pain Intensity 1: 0 Activity Tolerance:  
Good. Please refer to the flowsheet for vital signs taken during this treatment. After treatment:  
[x]     Patient left in no apparent distress sitting up in chair 
[]     Patient left in no apparent distress in bed 
[]     Call bell left within reach [x]     Nursing notified 
[]     Caregiver present 
[]     Bed alarm activated COMMUNICATION/EDUCATION:  
The patients plan of care was discussed with: Registered Nurse. Patient was educated regarding her deficit(s) of balance as this relates to her diagnosis of CVA. She demonstrated Good understanding. Patient and/or family was verbally educated on the BE FAST acronym for signs/symptoms of CVA and TIA. Informed patient to refer to the Stroke Binder for further BE FAST information. All questions answered with patient indicating her understanding. [x]  Fall prevention education was provided and the patient/caregiver indicated understanding. []  Patient/family have participated as able in goal setting and plan of care. [x]  Patient/family agree to work toward stated goals and plan of care. []  Patient understands intent and goals of therapy, but is neutral about his/her participation. []  Patient is unable to participate in goal setting and plan of care. Thank you for this referral. 
Francesco Dee, PT Time Calculation: 17 mins

## 2019-01-19 NOTE — PROGRESS NOTES
[address], [address] [facility] 
[phone] Medical Progress Note NAME: Ashley Kimbrough :  1929 MRM:  [id number] Date/Time: 2019 Subjective: Chief Complaint:  Patient was seen and examined by me. Chart reviewed. Weakness has improved. No other deficits Objective:  
 
 
Vitals:  
 
 
Last 24hrs VS reviewed since prior progress note. Most recent are: 
 
Visit Vitals /56 Pulse 66 Resp 15 Ht 5' 7\" (1.702 m) Wt 77.1 kg (170 lb) SpO2 90% BMI 26.63 kg/m² SpO2 Readings from Last 6 Encounters:  
19 90% No intake or output data in the 24 hours ending 19 1273 Exam:  
 
Physical Exam: 
 
Gen:  Elderly, pleasant, NAD HEENT:  Pink conjunctivae, PERRL, hearing intact to voice, moist mucous membranes Neck:  Supple, without masses, thyroid non-tender Resp:  No accessory muscle use, clear breath sounds without wheezes rales or rhonchi 
Card:  No murmurs, normal S1, S2 without thrills, bruits or peripheral edema Abd:  Soft, non-tender, non-distended, normoactive bowel sounds are present Musc:  No cyanosis or clubbing Skin:  No rashes Neuro:  Cranial nerves 3-12 are grossly intact,  strength is 5/5 bilaterally and dorsi / plantarflexion is 5/5 bilaterally, follows commands appropriately Psych:  Good insight, oriented to person, place and time, alert Medications Reviewed: (see below) Lab Data Reviewed: (see below) 
 
______________________________________________________________________ Medications:  
 
Current Facility-Administered Medications Medication Dose Route Frequency  sodium chloride (NS) flush 5-40 mL  5-40 mL IntraVENous Q8H  
 sodium chloride (NS) flush 5-40 mL  5-40 mL IntraVENous PRN  
 acetaminophen (TYLENOL) tablet 650 mg  650 mg Oral Q4H PRN  
 enoxaparin (LOVENOX) injection 40 mg  40 mg SubCUTAneous Q24H  aspirin tablet 325 mg  325 mg Oral DAILY  carvedilol (COREG) tablet 3.125 mg  3.125 mg Oral BID WITH MEALS  furosemide (LASIX) tablet 20 mg  20 mg Oral DAILY  lisinopril (PRINIVIL, ZESTRIL) tablet 2.5 mg  2.5 mg Oral DAILY Current Outpatient Medications Medication Sig  
 aspirin delayed-release 81 mg tablet Take 81 mg by mouth daily.  carvedilol (COREG) 3.125 mg tablet Take 3.125 mg by mouth two (2) times daily (with meals).  furosemide (LASIX) 20 mg tablet Take 20 mg by mouth daily.  lisinopril (PRINIVIL, ZESTRIL) 2.5 mg tablet Take 2.5 mg by mouth daily. Lab Review:  
 
Recent Labs  
  01/19/19 
0344 WBC 4.5 HGB 11.9 HCT 36.5 * Recent Labs  
  01/19/19 
0344   
K 4.1  CO2 25 GLU 93 BUN 14  
CREA 0.81 CA 8.9 No results found for: Nisha Mcnally Assessment / Plan: Active Problems: 
 
81 yo hx of HTN, CHF, presented w/ L sided weakness, concerning for CVA 1) L sided weakness/CVA (cerebral vascular accident): symptoms have resolved. Awaiting head MRI, dopplers, echo, lipids. Cont ASA. Start statin if LDL >70 
 
2) Hx of CHF: volume stable. Awaiting echo. Cont coreg, ACEi, lasix 3) Debility: consult PT/OT Total time spent with patient: 20 Minutes Care Plan discussed with: Patient, nursing Discussed:  Care Plan Prophylaxis:  Lovenox Disposition:  Home w/Family 
        
___________________________________________________ Attending Physician: Rosalind Varghese MD

## 2019-01-19 NOTE — ED NOTES
Pt presents to the ED from [facility] ED c/o LUE weakness. symptoms started at [id number] after pt drove herself to the restaurant. Upon arrival, left sided peripheral vision loss is noted. Pt is oriented x4, family is at bedside. NIH screening done and plan of care discussed with pt and family .

## 2019-01-20 LAB
ANION GAP SERPL CALC-SCNC: 9 MMOL/L (ref 5–15)
BUN SERPL-MCNC: 16 MG/DL (ref 6–20)
BUN/CREAT SERPL: 15 (ref 12–20)
CALCIUM SERPL-MCNC: 8.5 MG/DL (ref 8.5–10.1)
CHLORIDE SERPL-SCNC: 107 MMOL/L (ref 97–108)
CO2 SERPL-SCNC: 25 MMOL/L (ref 21–32)
CREAT SERPL-MCNC: 1.06 MG/DL (ref 0.55–1.02)
ERYTHROCYTE [DISTWIDTH] IN BLOOD BY AUTOMATED COUNT: 14.5 % (ref 11.5–14.5)
GLUCOSE SERPL-MCNC: 83 MG/DL (ref 65–100)
HCT VFR BLD AUTO: 34.4 % (ref 35–47)
HGB BLD-MCNC: 11.7 G/DL (ref 11.5–16)
MAGNESIUM SERPL-MCNC: 2 MG/DL (ref 1.6–2.4)
MCH RBC QN AUTO: 32.8 PG (ref 26–34)
MCHC RBC AUTO-ENTMCNC: 34 G/DL (ref 30–36.5)
MCV RBC AUTO: 96.4 FL (ref 80–99)
NRBC # BLD: 0 K/UL (ref 0–0.01)
NRBC BLD-RTO: 0 PER 100 WBC
PHOSPHATE SERPL-MCNC: 3.7 MG/DL (ref 2.6–4.7)
PLATELET # BLD AUTO: 134 K/UL (ref 150–400)
PMV BLD AUTO: 10.7 FL (ref 8.9–12.9)
POTASSIUM SERPL-SCNC: 3.7 MMOL/L (ref 3.5–5.1)
RBC # BLD AUTO: 3.57 M/UL (ref 3.8–5.2)
SODIUM SERPL-SCNC: 141 MMOL/L (ref 136–145)
WBC # BLD AUTO: 5.1 K/UL (ref 3.6–11)

## 2019-01-20 PROCEDURE — 97530 THERAPEUTIC ACTIVITIES: CPT

## 2019-01-20 PROCEDURE — 74011250636 HC RX REV CODE- 250/636: Performed by: INTERNAL MEDICINE

## 2019-01-20 PROCEDURE — 84100 ASSAY OF PHOSPHORUS: CPT

## 2019-01-20 PROCEDURE — 36415 COLL VENOUS BLD VENIPUNCTURE: CPT

## 2019-01-20 PROCEDURE — 97116 GAIT TRAINING THERAPY: CPT

## 2019-01-20 PROCEDURE — 96374 THER/PROPH/DIAG INJ IV PUSH: CPT

## 2019-01-20 PROCEDURE — 74011250637 HC RX REV CODE- 250/637: Performed by: INTERNAL MEDICINE

## 2019-01-20 PROCEDURE — 65660000000 HC RM CCU STEPDOWN

## 2019-01-20 PROCEDURE — 80048 BASIC METABOLIC PNL TOTAL CA: CPT

## 2019-01-20 PROCEDURE — 77030038269 HC DRN EXT URIN PURWCK BARD -A

## 2019-01-20 PROCEDURE — 85027 COMPLETE CBC AUTOMATED: CPT

## 2019-01-20 PROCEDURE — 83735 ASSAY OF MAGNESIUM: CPT

## 2019-01-20 RX ORDER — CLOPIDOGREL BISULFATE 75 MG/1
75 TABLET ORAL DAILY
Status: DISCONTINUED | OUTPATIENT
Start: 2019-01-20 | End: 2019-01-21 | Stop reason: HOSPADM

## 2019-01-20 RX ADMIN — Medication 10 ML: at 05:11

## 2019-01-20 RX ADMIN — FUROSEMIDE 20 MG: 40 TABLET ORAL at 08:58

## 2019-01-20 RX ADMIN — CARVEDILOL 3.12 MG: 6.25 TABLET, FILM COATED ORAL at 17:26

## 2019-01-20 RX ADMIN — Medication 10 ML: at 20:51

## 2019-01-20 RX ADMIN — Medication 10 ML: at 13:31

## 2019-01-20 RX ADMIN — LISINOPRIL 2.5 MG: 5 TABLET ORAL at 08:59

## 2019-01-20 RX ADMIN — ENOXAPARIN SODIUM 40 MG: 40 INJECTION SUBCUTANEOUS at 08:59

## 2019-01-20 RX ADMIN — CLOPIDOGREL BISULFATE 75 MG: 75 TABLET ORAL at 08:59

## 2019-01-20 RX ADMIN — ATORVASTATIN CALCIUM 20 MG: 10 TABLET, FILM COATED ORAL at 20:51

## 2019-01-20 RX ADMIN — CARVEDILOL 3.12 MG: 6.25 TABLET, FILM COATED ORAL at 08:59

## 2019-01-20 NOTE — PROGRESS NOTES
Bedside and Verbal shift change report given to Horton Bamberger, RN  (oncoming nurse) by Denice Negrete RN (offgoing nurse). Report included the following information SBAR, Kardex, ED Summary, Intake/Output, MAR and Accordion.

## 2019-01-20 NOTE — PROGRESS NOTES
Patient seen and examined in ER at [facility] on 1-19-19. Medium sized rightbiccipital stroke. No stenosis on CTA [facility].  Exam only shows left superior lateral quadrantanopia. Asa dcd, Plavix and lexapro added.   Will see again in AM

## 2019-01-20 NOTE — PROGRESS NOTES
[address], [address] [place] [address] 
[phone] Medical Progress Note NAME: Sherrilyn Kendall D. Emilie Bence :  1929 MRM:  [id number] Date/Time: 2019 Subjective: Chief Complaint:  Patient was seen and examined by me. Chart reviewed. L sided weakness better Objective:  
 
 
Vitals:  
 
 
Last 24hrs VS reviewed since prior progress note. Most recent are: 
 
Visit Vitals /49 (BP Patient Position: Pre-activity; Sitting) Pulse 67 Temp 98 °F (36.7 °C) Resp 18 Ht 5' 7\" (1.702 m) Wt 80.2 kg (176 lb 12.9 oz) SpO2 92% Breastfeeding? No  
BMI 27.69 kg/m² SpO2 Readings from Last 6 Encounters:  
19 92% Intake/Output Summary (Last 24 hours) at 2019 1102 Last data filed at 2019 1045 Gross per 24 hour Intake 240 ml Output 550 ml Net -310 ml Exam:  
 
Physical Exam: 
 
Gen:  Elderly, pleasant, NAD HEENT:  Pink conjunctivae, PERRL, hearing intact to voice, moist mucous membranes Neck:  Supple, without masses, thyroid non-tender Resp:  No accessory muscle use, clear breath sounds without wheezes rales or rhonchi 
Card:  No murmurs, normal S1, S2 without thrills, bruits or peripheral edema Abd:  Soft, non-tender, non-distended, normoactive bowel sounds are present Musc:  No cyanosis or clubbing Skin:  No rashes Neuro:  Cranial nerves 3-12 are grossly intact,  strength is 5/5 bilaterally and dorsi / plantarflexion is 5/5 bilaterally, follows commands appropriately Psych:  Good insight, oriented to person, place and time, alert Medications Reviewed: (see below) Lab Data Reviewed: (see below) 
 
______________________________________________________________________ Medications:  
 
Current Facility-Administered Medications Medication Dose Route Frequency  clopidogrel (PLAVIX) tablet 75 mg  75 mg Oral DAILY  sodium chloride (NS) flush 5-40 mL  5-40 mL IntraVENous Q8H  
 sodium chloride (NS) flush 5-40 mL  5-40 mL IntraVENous PRN  
 acetaminophen (TYLENOL) tablet 650 mg  650 mg Oral Q4H PRN  
 enoxaparin (LOVENOX) injection 40 mg  40 mg SubCUTAneous Q24H  carvedilol (COREG) tablet 3.125 mg  3.125 mg Oral BID WITH MEALS  furosemide (LASIX) tablet 20 mg  20 mg Oral DAILY  lisinopril (PRINIVIL, ZESTRIL) tablet 2.5 mg  2.5 mg Oral DAILY  atorvastatin (LIPITOR) tablet 20 mg  20 mg Oral QHS Lab Review:  
 
Recent Labs  
  01/20/19 
0204 01/19/19 
0344 WBC 5.1 4.5 HGB 11.7 11.9 HCT 34.4* 36.5 * 121* Recent Labs  
  01/20/19 
0204 01/19/19 
0344  143  
K 3.7 4.1  108 CO2 25 25 GLU 83 93 BUN 16 14 CREA 1.06* 0.81 CA 8.5 8.9 MG 2.0  --   
PHOS 3.7  -- No results found for: [facility] Assessment / Plan: Active Problems: 
 
81 yo hx of HTN, CHF, presented w/ L sided weakness, R sided CVA 1) L sided weakness/CVA (cerebral vascular accident): symptoms improving. R sided CVA seen on MRI. Carotid dopplers and echo pending. LDL 72. Will d/c ASA, start plavix, lipitor. Neuro, PT/OT following. Will need rehab 2) Hx of CHF: volume stable. Awaiting echo. Cont coreg, ACEi, lasix 3) Debility: PT/OT following, recommended rehab Total time spent with patient: 20 Minutes Care Plan discussed with: Patient, nursing Discussed:  Care Plan Prophylaxis:  Lovenox Disposition:  Rehab 
        
___________________________________________________ Attending Physician: Marylin Loza MD

## 2019-01-20 NOTE — PROGRESS NOTES
Neurology Progress Note Interval Hx: Follow up: right occipital, posterior-temporal stroke Pt resting in bed, awake, alert, conversant, moving all extremities without difficulty. She doesn't notice any visual deficit, though on repeat exam, she continues to have left superior-lateral quadrantanopia Current Facility-Administered Medications:  
  clopidogrel (PLAVIX) tablet 75 mg, 75 mg, Oral, DAILY, Dawson Alaniz MD, 75 mg at 01/20/19 7441   sodium chloride (NS) flush 5-40 mL, 5-40 mL, IntraVENous, Q8H, Eben Aiken MD, 10 mL at 01/20/19 0511 
  sodium chloride (NS) flush 5-40 mL, 5-40 mL, IntraVENous, PRN, Eben Kc MD 
  acetaminophen (TYLENOL) tablet 650 mg, 650 mg, Oral, Q4H PRN, 650 mg at 01/19/19 2332 **OR** [DISCONTINUED] acetaminophen (TYLENOL) solution 650 mg, 650 mg, Per NG tube, Q4H PRN **OR** [DISCONTINUED] acetaminophen (TYLENOL) suppository 650 mg, 650 mg, Rectal, Q4H PRN, Eben Aiken MD 
  enoxaparin (LOVENOX) injection 40 mg, 40 mg, SubCUTAneous, Q24H, Deo Bee MD, 40 mg at 01/20/19 5053   carvedilol (COREG) tablet 3.125 mg, 3.125 mg, Oral, BID WITH MEALS, Eben Aiken MD, 3.125 mg at 01/20/19 1042   furosemide (LASIX) tablet 20 mg, 20 mg, Oral, DAILY, Eben Aiken MD, 20 mg at 01/20/19 5412   atorvastatin (LIPITOR) tablet 20 mg, 20 mg, Oral, QHS, Dawson Alaniz MD, 20 mg at 01/19/19 2053 Physical Exam 
 
Patient Vitals for the past 12 hrs: 
 Temp Pulse Resp BP SpO2  
01/20/19 1205 98.3 °F (36.8 °C) 77 18 104/54 90 % 01/20/19 0939  67  112/49   
01/20/19 0936  66  116/48   
01/20/19 0858  69  119/73   
01/20/19 0842 98 °F (36.7 °C) 80 18 109/47 92 % 01/20/19 0703  60     
01/20/19 0402 98.3 °F (36.8 °C) 69 18 102/47 91 % Impression/ Plan Acute, medium size, right occipital and right posterior temporal lobe stroke. Left superior lateral quadrantanopia. Continue Plavix and Lipitor. Lisinopril d/c'd due to BP running low side of normal and I prefer she has increased cerebral perfusion in setting of recent stroke. D/[place] x 6 months after stroke, unless preliminary cleared at follow up visit by a Physician, then formally cleared by [facility]. She expressed understanding and even recognized that she may need to have repeat testing at [facility] before she can drive again. Will follow up tomorrow if she remains admitted.

## 2019-01-20 NOTE — PROGRESS NOTES
Problem: Mobility Impaired (Adult and Pediatric) Goal: *Acute Goals and Plan of Care (Insert Text) Physical Therapy Goals Initiated 1/19/2019 1. Patient will move from supine to sit and sit to supine  in bed with independence within 7 day(s). 2.  Patient will transfer from bed to chair and chair to bed with supervision/set-up using the least restrictive device within 7 day(s). 3.  Patient will perform sit to stand with modified independence within 7 day(s). 4.  Patient will ambulate with supervision/set-up for 100 feet with the least restrictive device within 7 day(s). 5.  Patient will ascend/descend 2 stairs with 1 handrail(s) with minimal assistance/contact guard assist within 7 day(s). 6.  Patient will improve Greenberg Balance score by 7 points within 7 days. physical Therapy TREATMENT Patient: Isela Mcfarlane (80 y.o. female) Date: 1/20/2019 Diagnosis: CVA (cerebral vascular accident) ([facility] [address] CVA (cerebral vascular accident) [facility]) CVA (cerebral vascular accident) ([facility] [address] Precautions: Fall Chart, physical therapy assessment, plan of care and goals were reviewed. ASSESSMENT:   Pt presents supine, eager to participate with therapy and then sit up in the chair. Pt demonstrating improvement with all functional mobility this date, increasing ambulation distance to 100 feet with RW at CGA x2.  Pt required verbal and tactile cues for walker management. Pt without LOB or path deviation during ambulation and with minimal fatigue post treatment. Recommend pt is a 1 person assist and board was adjusted. Pt would benefit from Rehab, anticipate continued gains. Progress toward goals: 
[x]    Improving appropriately and progressing toward goals 
[]    Improving slowly and progressing toward goals 
[]    Not making progress toward goals and plan of care will be adjusted PLAN: 
Patient continues to benefit from skilled intervention to address the above impairments. Continue treatment per established plan of care. Discharge Recommendations:  Rehab Further Equipment Recommendations for Discharge: RW  
 
SUBJECTIVE:  
Patient stated I feel stronger today.  \"I think I will need one of these walkers when I leave. \" OBJECTIVE DATA SUMMARY:  
Critical Behavior: 
Neurologic State: Alert Orientation Level: Oriented X4 Cognition: Follows commands Safety/Judgement: Insight into deficits Functional Mobility Training: 
Bed Mobility: 
  
Supine to Sit: Stand-by assistance Transfers: 
Sit to Stand: Contact guard assistance Stand to Sit: Contact guard assistance Balance: 
Sitting: Intact; With support Standing: With support; Intact Standing - Static: Good Standing - Dynamic : FairAmbulation/Gait Training: 
Distance (ft): 100 Feet (ft) Assistive Device: Gait belt;Walker, rolling Ambulation - Level of Assistance: Contact guard assistance;Assist x2 Speed/Dalia: Slow Pain: 
Pain Scale 1: Numeric (0 - 10) Pain Intensity 1: 0 Activity Tolerance:  
Good. Please refer to the flowsheet for vital signs taken during this treatment. After treatment:  
[x]    Patient left in no apparent distress sitting up in chair 
[]    Patient left in no apparent distress in bed 
[x]    Call bell left within reach [x]    Nursing notified 
[]    Caregiver present [x]    chair alarm activated COMMUNICATION/COLLABORATION:  
The patients plan of care was discussed with: Registered Nurse Valentine Guillen PTA Time Calculation: 30 mins

## 2019-01-20 NOTE — CONSULTS
Name:  Aravind Caro      :  1929    PCP:   No primary care provider on file. Referring:  Dixie Urban MD  MRN:   [id number]    Chief Complaint:   Chief Complaint   Patient presents with    Extremity Weakness       HISTORY OF PRESENT ILLNESS:     This is a 80 y.o. female who presents for stroke evaluation. Patient was seen/ examined on 19 around 4 PM, brief progress note entered. This is the full consult note. Patient explains that she was discharged from a hospital 1 week ago for treatment of CHF but was feeling well on 19 when she was driving her friends to a restaurant for dinner. At some point when she got there, she was reaching for her purse with the left hand and couldn't seem to grasp it or dropped it. She leaned toward the purse but something didn't feel right and she left herself drift down to the ground. Bystanders helped her up and called 911. She was taken to [facility] where they detected left sided weakness, NIH 6 and was apparently offered tPA but she declined due some hx of bleeding. CTA Head and Neck was done (report reviewed) and didn't show any significant stenosis. As the hospital did not have a Neurologist, she was transferred to [address] [place] [address] Alan De La Fuente for further stroke evaluation. Brain MRI was done and shows a moderate size right occipital ischemic stroke extending into right posterior temporal lobe. MRA Brain and Carotid doppler did not show any significant abnormalities. She has been changed from ASA to Plavix 75 mg/ day, and Lipitor 20 mg QHS added for LDL at 72 (above goal of < 70). Complete Review of Systems: reviewed on intake H&P; refer to media section; otherwise as noted in HPI     Allergies   Allergen Reactions    Erythromycin Unknown (comments)    Sulfa (Sulfonamide Antibiotics) Unknown (comments)     History reviewed. No pertinent past medical history.   Current Facility-Administered Medications   Medication Dose Route Frequency Provider Last Rate Last Dose    clopidogrel (PLAVIX) tablet 75 mg  75 mg Oral DAILY Dawson Alaniz MD   75 mg at 01/20/19 0859    sodium chloride (NS) flush 5-40 mL  5-40 mL IntraVENous Q8H Eben Aiken MD   10 mL at 01/20/19 0511    sodium chloride (NS) flush 5-40 mL  5-40 mL IntraVENous PRN Eben Aiken MD        acetaminophen (TYLENOL) tablet 650 mg  650 mg Oral Q4H PRN Eben Aiken MD   650 mg at 01/19/19 2332    enoxaparin (LOVENOX) injection 40 mg  40 mg SubCUTAneous Q24H Eben Aiken MD   40 mg at 01/20/19 0859    carvedilol (COREG) tablet 3.125 mg  3.125 mg Oral BID WITH MEALS Eben Aiken MD   3.125 mg at 01/20/19 0859    furosemide (LASIX) tablet 20 mg  20 mg Oral DAILY Eben Aiken MD   20 mg at 01/20/19 0858    lisinopril (PRINIVIL, ZESTRIL) tablet 2.5 mg  2.5 mg Oral DAILY Eben Aiken MD   2.5 mg at 01/20/19 0859    atorvastatin (LIPITOR) tablet 20 mg  20 mg Oral QHS Dawson Alaniz MD   20 mg at 01/19/19 2053     History reviewed. No pertinent surgical history.   Family History   Problem Relation Age of Onset    Heart Disease Sister      Social History     Socioeconomic History    Marital status: SINGLE     Spouse name: Not on file    Number of children: Not on file    Years of education: Not on file    Highest education level: Not on file   Social Needs    Financial resource strain: Not on file    Food insecurity - worry: Not on file    Food insecurity - inability: Not on file    Transportation needs - medical: Not on file   [employer] needs - non-medical: Not on file   Occupational History    Not on file   Tobacco Use    Smoking status: Never Smoker    Smokeless tobacco: Never Used   Substance and Sexual Activity    Alcohol use: No     Frequency: Never    Drug use: No    Sexual activity: No   Other Topics Concern    Not on file   Social History Narrative    Not on file       PHYSICAL EXAM  Patient Vitals for the past 12 hrs:   Temp Pulse Resp BP SpO2   01/20/19 [place]  112/49    01/20/19 0936  66  116/48    01/20/19 0858  69  119/73    01/20/19 0842 98 °F (36.7 °C) 80 18 109/47 92 %   01/20/19 0703  60      01/20/19 0402 98.3 °F (36.8 °C) 69 18 102/47 91 %         General:  Alert, cooperative, NAD   Head:  Normocephalic, atraumatic. Eyes:  Conjunctivae/corneas clear   Lungs:  Heart:  Non labored breathing  Regular rate, rhythm   Extremities: No edema. Skin: No rashes    Neurologic Exam       Language: normal  Memory:  Alert, oriented to person, place, situation    Cranial Nerves:  I: smell Not tested   II: visual fields Left superior quadrantanopia     II: pupils Symmetric, reactive   II: optic disc No papilledema   III,VII: ptosis none   III,IV,VI: extraocular muscles  normal   V: facial light touch sensation  normal   VII: facial muscle function  symmetric   VIII: hearing symmetric   IX: soft palate elevation  normal   XI: sternocleidomastoid strength 5/5   XII: tongue  midline      Motor: normal bulk, tone, strength in all exts  Sensory: intact to LT, PP x 4 exts   Cerebellar: no rest, postural, or intention tremor  Normal FNF bilaterally  Reflexes: 2+ throughout  Plantar response: neutral bilaterally    Gait: not examined  Romberg not examined       ASSESSMENT AND PLAN    ICD-10-CM ICD-9-CM    1. Cerebrovascular accident (CVA), unspecified mechanism ([facility] [address] I63.9 434.91        Medium size, acute ischemic right occipital and posterior temporal lobe infarct. Left superior quadrantanopia. Continue Plavix, Lipitor    BP running low-normal.  Will stop Lisinopril to improve cerebral perfusion. Defer to Hospitalist whether Cardiology Consult needed given BP med changes in setting of recent CHF diagnosis (v exacerbation). Will continue following      Thank you for allowing me to be a part of your patient's care. Please call me at [phone] if you have any questions.      Sincerely,  Marie Olson MD

## 2019-01-20 NOTE — PROGRESS NOTES
1/20/2019  
1:11 PM 
 
Reason for Admission:   CVA RRAT Score:     15 Do you (patient/family) have any concerns for transition/discharge? No concerns expressed by pt at this time. Plan for utilizing home health:      Has never had HH, but is open if needed. Likelihood of readmission? Moderate Transition of Care Plan:     
CM met with pt and confirmed all demographics. Pt lives alone in a two story home with 1 step to enter into the home. Pt stated her main living area is on the first floor. Pt uses [facility] in [place]. Pt's PCP is Dr. Ashley Crenshaw and last visit, per pt, was about 2 weeks ago. Pt is able to perform all ADL's independently. Pt drives herself to all appointments. Consult noted for Rehab, pt is open to [facility] for inpt rehab. [place] letter signed. Referral submitted to [facility] via [employer]. CM will continue to follow up. Care Management Interventions PCP Verified by CM: Yes(Maik Long, No NN ) Last Visit to PCP: 01/03/19 Palliative Care Criteria Met (RRAT>21 & CHF Dx)?: No 
Mode of Transport at Discharge: Other (see comment)(Family ) Transition of Care Consult (CM Consult): Discharge Planning MyChart Signup: No 
Discharge Durable Medical Equipment: No 
Physical Therapy Consult: Yes Occupational Therapy Consult: Yes Current Support Network: Lives Alone Confirm Follow Up Transport: Self Plan discussed with Pt/Family/Caregiver: Yes [employer] Information Provided?: No 
Discharge Location Discharge Placement: Skilled nursing facility [facility]

## 2019-01-21 ENCOUNTER — HOSPITAL ENCOUNTER (OUTPATIENT)
Dept: REHABILITATION | Age: 84
End: 2019-01-31
Attending: PHYSICAL MEDICINE & REHABILITATION | Admitting: PHYSICAL MEDICINE & REHABILITATION

## 2019-01-21 VITALS
BODY MASS INDEX: 27.92 KG/M2 | HEART RATE: 92 BPM | HEIGHT: 67 IN | TEMPERATURE: 98.7 F | WEIGHT: 177.9 LBS | SYSTOLIC BLOOD PRESSURE: 92 MMHG | DIASTOLIC BLOOD PRESSURE: 49 MMHG | OXYGEN SATURATION: 92 % | RESPIRATION RATE: 17 BRPM

## 2019-01-21 PROBLEM — R53.1 LEFT-SIDED WEAKNESS: Status: ACTIVE | Noted: 2019-01-21

## 2019-01-21 LAB
APPEARANCE UR: CLEAR
BACTERIA URNS QL MICRO: NEGATIVE /HPF
BILIRUB UR QL CFM: NEGATIVE
COLOR UR: ABNORMAL
EPITH CASTS URNS QL MICRO: ABNORMAL /LPF
GLUCOSE UR STRIP.AUTO-MCNC: NEGATIVE MG/DL
HGB UR QL STRIP: NEGATIVE
HYALINE CASTS URNS QL MICRO: ABNORMAL /LPF (ref 0–5)
KETONES UR QL STRIP.AUTO: ABNORMAL MG/DL
LEUKOCYTE ESTERASE UR QL STRIP.AUTO: ABNORMAL
NITRITE UR QL STRIP.AUTO: NEGATIVE
PH UR STRIP: 5 [PH] (ref 5–8)
PROT UR STRIP-MCNC: NEGATIVE MG/DL
RBC #/AREA URNS HPF: ABNORMAL /HPF (ref 0–5)
SP GR UR REFRACTOMETRY: 1.02 (ref 1–1.03)
UA: UC IF INDICATED,UAUC: ABNORMAL
UROBILINOGEN UR QL STRIP.AUTO: 2 EU/DL (ref 0.2–1)
WBC URNS QL MICRO: ABNORMAL /HPF (ref 0–4)

## 2019-01-21 PROCEDURE — 74011250637 HC RX REV CODE- 250/637: Performed by: INTERNAL MEDICINE

## 2019-01-21 PROCEDURE — 97112 NEUROMUSCULAR REEDUCATION: CPT

## 2019-01-21 PROCEDURE — 81001 URINALYSIS AUTO W/SCOPE: CPT

## 2019-01-21 PROCEDURE — 74011250636 HC RX REV CODE- 250/636: Performed by: INTERNAL MEDICINE

## 2019-01-21 PROCEDURE — 97116 GAIT TRAINING THERAPY: CPT

## 2019-01-21 RX ORDER — CLOPIDOGREL BISULFATE 75 MG/1
75 TABLET ORAL DAILY
Qty: 30 TAB | Refills: 0 | Status: SHIPPED | OUTPATIENT
Start: 2019-01-21 | End: 2019-02-11

## 2019-01-21 RX ORDER — ATORVASTATIN CALCIUM 20 MG/1
20 TABLET, FILM COATED ORAL
Qty: 30 TAB | Refills: 0 | Status: SHIPPED | OUTPATIENT
Start: 2019-01-21 | End: 2019-02-10

## 2019-01-21 RX ADMIN — ENOXAPARIN SODIUM 40 MG: 40 INJECTION SUBCUTANEOUS at 10:20

## 2019-01-21 RX ADMIN — CLOPIDOGREL BISULFATE 75 MG: 75 TABLET ORAL at 10:20

## 2019-01-21 RX ADMIN — Medication 10 ML: at 05:34

## 2019-01-21 RX ADMIN — CARVEDILOL 3.12 MG: 6.25 TABLET, FILM COATED ORAL at 10:20

## 2019-01-21 RX ADMIN — FUROSEMIDE 20 MG: 40 TABLET ORAL at 10:20

## 2019-01-21 NOTE — DISCHARGE SUMMARY
Physician Discharge Summary     Patient ID:  Mary Li  [id number]  80 y.o.  4/14/1929    Admit date: 1/18/2019    Discharge date and time: 1/21/2019    Admission Diagnoses: CVA (cerebral vascular accident) [facility])    Discharge Diagnoses:    Principal Diagnosis   CVA (cerebral vascular accident) ([facility] [address]                                             Other Diagnoses    CHF (congestive heart failure) ([facility] [address] (1/18/2019)    Aortic stenosis ()    CHF NYHA class II, chronic, diastolic (HCC) ()    HTN (hypertension) ()    Left-sided weakness (1/21/2019)     Hospital Course:   CVA (cerebral vascular accident) - POA, symptoms improving. R sided CVA seen on MRI.  Carotid dopplers and echo otherwise unremarkable.  LDL 67.  Will d/c on plavix, lipitor and HTN meds.  Neuro, PT/OT following.  Will need rehab     Diastolic CHF, chronic / Aortic stenosis / HTN - volume stable.  Noted valve issues on echo.  Cont coreg, lasix. BP running low, so stopped ACE, and coreg is minimal     L sided weakness / Debility - PT/OT following, recommended rehab    PCP: Shaheen Guerra MD    Consults: Neurology    Significant Diagnostic Studies: See Hospital Course    Discharged home in improved condition. Discharge Exam:  BP 94/44 (BP 1 Location: Left arm, BP Patient Position: At rest;Supine)   Pulse 72   Temp 98.6 °F (37 °C)   Resp 16   Ht 5' 7\" (1.702 m)   Wt 80.7 kg (177 lb 14.4 oz)   SpO2 90%   Breastfeeding?  No   BMI 27.86 kg/m²      Gen:  Well-developed, well-nourished, in no acute distress  HEENT:  Pink conjunctivae, PERRL, hearing intact to voice, moist mucous membranes  Neck:  Supple, without masses, thyroid non-tender  Resp:  No accessory muscle use, clear breath sounds without wheezes rales or rhonchi  Card:  No murmurs, normal S1, S2 without thrills, bruits or peripheral edema  Abd:  Soft, non-tender, non-distended, normoactive bowel sounds are present, no mass  Lymph:  No cervical or inguinal adenopathy  Musc:  No cyanosis or clubbing  Skin:  No rashes or ulcers, skin turgor is good  Neuro:  Cranial nerves are grossly intact, Mild L side motor weakness, follows commands appropriately  Psych:  Good insight, oriented to person, place and time, alert    Patient Instructions:   Current Discharge Medication List      START taking these medications    Details   atorvastatin (LIPITOR) 20 mg tablet Take 1 Tab by mouth nightly for 30 days. Qty: 30 Tab, Refills: 0      clopidogrel (PLAVIX) 75 mg tab Take 1 Tab by mouth daily for 30 days. Qty: 30 Tab, Refills: 0         CONTINUE these medications which have NOT CHANGED    Details   carvedilol (COREG) 3.125 mg tablet Take 3.125 mg by mouth two (2) times daily (with meals). furosemide (LASIX) 20 mg tablet Take 20 mg by mouth daily. STOP taking these medications       aspirin delayed-release 81 mg tablet Comments:   Reason for Stopping:         lisinopril (PRINIVIL, ZESTRIL) 2.5 mg tablet Comments:   Reason for Stopping:             Activity: Activity as tolerated and PT/OT Eval and Treat  Diet: Cardiac Diet  Wound Care: None needed    Follow-up with your PCP and neurology in a few weeks.   Follow-up tests/labs - none    Signed:  Shelbie Schaumann, MD  1/21/2019  9:18 AM

## 2019-01-21 NOTE — PROGRESS NOTES
Stroke Education provided to patient and the following topics were discussed 1. Patients personal risk factors for stroke are hyperlipidemia and CHF 2. Warning signs of Stroke: * Sudden numbness or weakness of the face, arm or leg, especially on one side of The body * Sudden confusion, trouble speaking or understanding * Sudden trouble seeing in one or both eyes * Sudden trouble walking, dizziness, loss of balance or coordination * Sudden severe headache with no known cause 3. Importance of activation Emergency Medical Services ( 9-1-1 ) immediately if experience any warning signs of stroke. 4. Be sure and schedule a follow-up appointment with your primary care doctor or any specialists as instructed. 5. You must take medicine every day to treat your risk factors for stroke. Be sure to take your medicines exactly as your doctor tells you: no more, no less. Know what your medicines are for , what they do. Anti-thrombotics /anticoagulants can help prevent strokes. You are taking the following medicine(s)  Lipitor, Coreg, Plavix. 6.  Smoking and second-hand smoke greatly increase your risk of stroke, cardiovascular disease and death. Smoking history never 7. Information provided was [facility] Stroke Education Binder, Stroke Handouts or Verbal Education 8. Documentation of teaching completed in Patient Education Activity and on Care Plan with teaching response noted? yes

## 2019-01-21 NOTE — PROGRESS NOTES
Pharmacist Discharge Medication Reconciliation Discharge Provider:  Dr. Ruiz Lew Discharge Medications: My Medications START taking these medications Instructions Each Dose to Equal Morning Noon Evening Bedtime  
atorvastatin 20 mg tablet Commonly known as:  LIPITOR Your last dose was: Your next dose is: Take 1 Tab by mouth nightly for 30 days. 20 mg 
  
  
  
  
  
clopidogrel 75 mg Tab Commonly known as:  PLAVIX Your last dose was: Your next dose is: Take 1 Tab by mouth daily for 30 days. 75 mg CONTINUE taking these medications Instructions Each Dose to Equal Morning Noon Evening Bedtime  
carvedilol 3.125 mg tablet Commonly known as:  Janalyn Hussain Your last dose was: Your next dose is: Take 3.125 mg by mouth two (2) times daily (with meals). 3.125 mg 
  
  
  
  
  
furosemide 20 mg tablet Commonly known as:  LASIX Your last dose was: Your next dose is: Take 20 mg by mouth daily. 20 mg 
  
  
  
  
  
 
  
 
STOP taking these medications   
 
aspirin delayed-release 81 mg tablet 
  
  
lisinopril 2.5 mg tablet Commonly known as:  Shaun Handing Where to Get Your Medications Information on where to get these meds will be given to you by the nurse or doctor. Ask your nurse or doctor about these medications · atorvastatin 20 mg tablet · clopidogrel 75 mg Tab The patient's chart, MAR, and AVS were reviewed by Claudean Beer, PHARMD, Contact: [phone]

## 2019-01-21 NOTE — PROGRESS NOTES
CM spoke with liaison of [facility]. Cm was informed that they are able to accept Pt today. Bed will be available after 2:30pm  
Call report: [phone] or [phone] Room would be verified at call report. Care Management Interventions PCP Verified by CM: Yes(Maik Long, No NN ) Last Visit to PCP: 01/03/19 Palliative Care Criteria Met (RRAT>21 & CHF Dx)?: No 
Mode of Transport at Discharge: Other (see comment)(Family ) Transition of Care Consult (CM Consult): Discharge Planning MyChart Signup: No 
Discharge Durable Medical Equipment: No 
Physical Therapy Consult: Yes Occupational Therapy Consult: Yes Current Support Network: Lives Alone Confirm Follow Up Transport: Self Plan discussed with Pt/Family/Caregiver: Yes [employer] Information Provided?: No 
Discharge Location Discharge Placement: Rehab hospital/unit acute([facility]) DANNY Ly, [facility]

## 2019-01-21 NOTE — PROGRESS NOTES
Problem: Mobility Impaired (Adult and Pediatric) Goal: *Acute Goals and Plan of Care (Insert Text) Physical Therapy Goals Initiated 1/19/2019 1. Patient will move from supine to sit and sit to supine  in bed with independence within 7 day(s). 2.  Patient will transfer from bed to chair and chair to bed with supervision/set-up using the least restrictive device within 7 day(s). 3.  Patient will perform sit to stand with modified independence within 7 day(s). 4.  Patient will ambulate with supervision/set-up for 100 feet with the least restrictive device within 7 day(s). 5.  Patient will ascend/descend 2 stairs with 1 handrail(s) with minimal assistance/contact guard assist within 7 day(s). 6.  Patient will improve Greenberg Balance score by 7 points within 7 days. physical Therapy TREATMENT Patient: Itzel Nixon (80 y.o. female) Date: 1/21/2019 Diagnosis: CVA (cerebral vascular accident) ([facility] [address] CVA (cerebral vascular accident) [facility]) CVA (cerebral vascular accident) ([facility] [address] Precautions: Fall Chart, physical therapy assessment, plan of care and goals were reviewed. ASSESSMENT: 
Patient perform slalom obstacle course focusing on turning sequencing, obstacle negotiation and balance. Patient still demonstrates decreased step lengths bilaterally, shorter on the Left and decreased foot clearance (without foot drag) with fatigue due to decreased strength and motor planning following Right occipital and posterior temporal CVA. Patient performed seated LE exercises focusing on quality of movement vs. Speed. Patient has occasional Left sided difficulty with obstacle negotiation. Requires cuing for improvement. She still would most benefit from inpatient rehab at discharge to maximize her function. Progression toward goals: 
[x]    Improving appropriately and progressing toward goals 
[]    Improving slowly and progressing toward goals []    Not making progress toward goals and plan of care will be adjusted PLAN: 
Patient continues to benefit from skilled intervention to address the above impairments. Continue treatment per established plan of care. Discharge Recommendations:  Inpatient Rehab Further Equipment Recommendations for Discharge:  TBD at rehab SUBJECTIVE:  
Patient stated Felicita Ortiz I go to the bathroom first. OBJECTIVE DATA SUMMARY:  
Critical Behavior: 
Neurologic State: Alert Orientation Level: Oriented X4 Cognition: Appropriate decision making, Appropriate for age attention/concentration, Appropriate safety awareness Safety/Judgement: Insight into deficits Functional Mobility Training: 
Bed Mobility: 
Rolling: Stand-by assistance Supine to Sit: Stand-by assistance Transfers: 
Sit to Stand: Contact guard assistance Stand to Sit: Contact guard assistance Bed to Chair: Minimum assistance Balance: 
Sitting: Intact Standing: ImpairedAmbulation/Gait Training: 
Distance (ft): 50 Feet (ft) Ambulation - Level of Assistance: Minimal assistance;Assist x1;Additional time Gait Abnormalities: Decreased step clearance; Step to gait(short step on Left) Base of Support: Narrowed Speed/Dalia: Slow Neuro Re-Education: 
slalom  obstacle course: 2 attempts- 1st with RW, 2nd with no assistive device Patient with short shuffled steps with turning sequence, shorter on the Left due to decreased strength and coordination. With no assistive device, patient has high guard and slower dalia as compensation for decreased balance Therapeutic Exercises:  
Seated: hip flexion, LAQ bilateral LE 2 sets of 10 Focus on motor sequence and control with quality of movement vs. Speed of movement Pain: 
Pain Scale 1: Numeric (0 - 10) Pain Intensity 1: 0 Activity Tolerance:  
Good- no complications, requires seated rest break between activities due to incresaed dyspnea Please refer to the flowsheet for vital signs taken during this treatment. After treatment:  
[]    Patient left in no apparent distress sitting up in chair 
[]    Patient left in no apparent distress in bed 
[]    Call bell left within reach 
[]    Nursing notified 
[]    Caregiver present 
[]    Bed alarm activated COMMUNICATION/COLLABORATION:  
The patients plan of care was discussed with: Registered Nurse Ruth Saab, PT, DPT Time Calculation: 27 mins

## 2019-01-21 NOTE — PROGRESS NOTES
RN goes over all discharge instructions with pt. And son. RN answers all questions. Pt states understanding and signs paperwork. IV removed. RN gives report to [facility] nurse. Will continue to monitor. Pt. Transferred to [facility] via wheelchair with son and volunteer. Will continue to monitor.

## 2019-01-21 NOTE — DISCHARGE INSTRUCTIONS
Patient Education       Patient Discharge Instructions    Mable ARRIETAMarysol Alexis Germain / [id number] : 1929    Admitted 2019 Discharged: 2019     Primary Diagnoses  Problem List as of 2019 Date Reviewed: 2019           Aortic stenosis   CHF NYHA class II, chronic, diastolic (HCC)   HTN (hypertension)   Left-sided weakness   * (Principal) CVA (cerebral vascular accident) ([facility] [address]   CHF (congestive heart failure) ([facility]          Take Home Medications     · It is important that you take the medication exactly as they are prescribed. · Keep your medication in the bottles provided by the pharmacist and keep a list of the medication names, dosages, and times to be taken in your wallet. · Do not take other medications without consulting your doctor. What to do at Home    Recommended diet: Cardiac Diet    Recommended activity: Activity as tolerated    If you experience worse symptoms, please follow up with neurology. Follow-up with your PCP in a few weeks        Information obtained by :  I understand that if any problems occur once I am at home I am to contact my physician. I understand and acknowledge receipt of the instructions indicated above.                                                                                                                                            Physician's or R.N.'s Signature                                                                  Date/Time                                                                                                                                              Patient or Representative Signature                                                          Date/Time      DMV Transient Ischemic Attack and/or Cerebral Vascular Accident Policy   It is the policy of the Department of Motor Vehicles, based on guidance and recommendations from the Medical Advisory Board, that if a  suffers a Transient Ischemic Attack (TIA), the s privilege to operate a motor vehicle will be suspended for three months. The three-month suspension may be shortened for drivers who have suffered a TIA, provided that treatment has been provided mitigating the risk of reoccurrence and there is no impact on a s ability to operate a motor vehicle safely. These cases may be referred to the [address] for their guidance and recommendations. If a  suffers a Cerebral Vascular Accident (CVA), the s privilege to operate a motor vehicle will be suspended for six months. This six-month suspension period may be shortened if V receives information from the 's health care provider indicating that the  has fully recovered. These cases may be referred to the [address] for its guidance and recommendations. Following the six-month suspension, [facility] will request an evaluation with a certified  rehabilitation specialist if the  has suffered paralysis or cognitive changes due to the CVA. If the s physical and cognitive information is not indicated in the medical report received by the agency, DMV will request it from the health care provider. Based on the information received about the s cognitive abilities, the  may also be subject to the [facility] (Cognitive Impairment Policy. The  is also required to furnish an updated Vision Report (MED-4) with an examination date that is not older than 90 days and occrs after the TIA/CVA, in accordance with [place] Code Section 46.2-311. [facility] may impose additional requirements on the individual depending on the information received by the agency. https://[email or web address].comjames.[email or web address] asp     Stroke: Care Instructions  Your Care Instructions    You have had a stroke. This means that the blood flow to a part of your brain was blocked for some time, which damages the nerve cells in that part of the brain.  The part of your body controlled by that part of your brain may not function properly now. The brain is an amazing organ that can heal itself to some degree. The stroke you had damaged part of your brain. But other parts of your brain may take over in some way for the damaged areas. You have already started this process. Your doctor will talk with you about what you can do to prevent another stroke. High blood pressure, high cholesterol, and diabetes are all risk factors for stroke. If you have any of these conditions, work with your doctor to make sure they are under control. Other risk factors for stroke include being overweight, smoking, and not getting regular exercise. Going home may be hard for you and your family. The more you can try to do for yourself, the better. Remember to take each day one at a time. Follow-up care is a key part of your treatment and safety. Be sure to make and go to all appointments, and call your doctor if you are having problems. It's also a good idea to know your test results and keep a list of the medicines you take. How can you care for yourself at home?    · Enter a stroke rehabilitation (rehab) program, if your doctor recommends it. Physical, speech, and occupational therapies can help you manage bathing, dressing, eating, and other basics of daily living.     · Do not drive until your doctor says it is okay.     · It is normal to feel sad or depressed after a stroke. If these feelings last, talk to your doctor.     · If you are having problems with urine leakage, go to the bathroom at regular times, including when you first wake up and at bedtime. Also, limit fluids after dinner.     · If you are constipated, drink plenty of fluids, enough so that your urine is light yellow or clear like water. If you have kidney, heart, or liver disease and have to limit fluids, talk with your doctor before you increase the amount of fluids you drink. Set up a regular time for using the toilet.  If you continue to have constipation, your doctor may suggest using a bulking agent, such as Metamucil, or a stool softener, laxative, or enema. Medicines    · Take your medicines exactly as prescribed. Call your doctor if you think you are having a problem with your medicine. You may be taking several medicines. ACE (angiotensin-converting enzyme) inhibitors, angiotensin II receptor blockers (ARBs), beta-blockers, diuretics (water pills), and calcium channel blockers control your blood pressure. Statins help lower cholesterol. Your doctor may also prescribe medicines for depression, pain, sleep problems, anxiety, or agitation.     · If your doctor has given you a blood thinner to prevent another stroke, be sure you get instructions about how to take your medicine safely. Blood thinners can cause serious bleeding problems.     · Do not take any over-the-counter medicines or herbal products without talking to your doctor first.     · If you take birth control pills or hormone therapy, talk to your doctor about whether they are right for you.    For family members and caregivers    · Make the home safe. Set up a room so that your loved one does not have to climb stairs. Be sure the bathroom is on the same floor. Move throw rugs and furniture that could cause falls. Make sure that the lighting is good. Put grab bars and seats in tubs and showers.     · Find out what your loved one can do and what he or she needs help with. Try not to do things for your loved one that your loved one can do on his or her own. Help him or her learn and practice new skills.     · Visit and talk with your loved one often. Try doing activities together that you both enjoy, such as playing cards or board games. Keep in touch with your loved one's friends as much as you can. Encourage them to visit.     · Take care of yourself. Do not try to do everything yourself. Ask other family members to help.  Eat well, get enough rest, and take time to do things that you enjoy. Keep up with your own doctor visits, and make sure to take your medicines regularly. Get out of the house as much as you can. Join a local support group. Find out if you qualify for home health care visits to help with rehab or for adult day care. When should you call for help? Call 911 anytime you think you may need emergency care. For example, call if:    · You have signs of another stroke. These may include:  ? Sudden numbness, tingling, weakness, or loss of movement in your face, arm, or leg, especially on only one side of your body. ? Sudden vision changes. ? Sudden trouble speaking. ? Sudden confusion or trouble understanding simple statements. ? Sudden problems with walking or balance. ? A sudden, severe headache that is different from past headaches. Call 911 even if these symptoms go away in a few minutes.    Call your doctor now or seek immediate medical care if:    · You have new symptoms that may be related to your stroke, such as falls or trouble swallowing.    Watch closely for changes in your health, and be sure to contact your doctor if you have any problems. Where can you learn more? Go to http://jameson-koby.info/. Enter I635 in the search box to learn more about \"Stroke: Care Instructions. \"  Current as of: September 26, 2018  Content Version: 11.9  © 7407-0261 [employer]. Care instructions adapted under license by [employer] (which disclaims liability or warranty for this information). If you have questions about a medical condition or this instruction, always ask your healthcare professional. [address] any warranty or liability for your use of this information.

## 2019-01-21 NOTE — PROGRESS NOTES
Speech Path Consult received and appreciated. Chart reviewed. Patient currently working with PT; will f/u as available. Addendum 1215: Followed up with patient who denies any speech or swallowing issues. She is able to engage in conversation without any aphasia, dysarthria, or apraxia. Plan is for discharge to [facility] today (?) therefore, formal assessment of integrated language will be deferred. No overt concerns in conversational speech. Signing off at this time. Joanie Gallagher MS, CCC-SLP, BCS-S

## 2019-01-21 NOTE — PROGRESS NOTES
Bedside and Verbal shift change report given to Candice Taylor RN (oncoming nurse) by Anaya Strickland RN (offgoing nurse). Report included the following information SBAR, Kardex, Intake/Output, MAR, Accordion and Recent Results.

## 2019-01-21 NOTE — PROGRESS NOTES
[facility] Medical Progress Note NAME: Alix Millan Presume :  1929 MRM:  [id number] Date/Time: 2019  9:11 AM 
 
  
Assessment and Plan:  
 
CVA (cerebral vascular accident) - POA, symptoms improving. R sided CVA seen on MRI. Carotid dopplers and echo otherwise unremarkable. LDL 72. Will d/c on plavix, lipitor and HTN meds. Neuro, PT/OT following. Will need rehab 
  
Diastolic CHF, chronic / Aortic stenosis / HTN - volume stable. Noted valve issues on echo. Cont coreg, lasix. BP running low, so stopped ACE, and coreg is minimal 
  
L sided weakness / Debility - PT/OT following, recommended rehab Subjective: Chief Complaint:  Feels better, ready to go to rehab 
 
ROS: 
(bold if positive, if negative) Tolerating some PT  Tolerating Diet Objective:  
 
Last 24hrs VS reviewed since prior progress note. Most recent are: 
 
Visit Vitals BP 94/44 (BP 1 Location: Left arm, BP Patient Position: At rest;Supine) Pulse 72 Temp 98.6 °F (37 °C) Resp 16 Ht 5' 7\" (1.702 m) Wt 80.7 kg (177 lb 14.4 oz) SpO2 90% Breastfeeding? No  
BMI 27.86 kg/m² SpO2 Readings from Last 6 Encounters:  
19 90% O2 Flow Rate (L/min): 1 l/min Intake/Output Summary (Last 24 hours) at 2019 6372 Last data filed at 2019 1045 Gross per 24 hour Intake  Output 250 ml Net -250 ml Physical Exam: 
 
Gen:  Well-developed, well-nourished, in no acute distress HEENT:  Pink conjunctivae, PERRL, hearing intact to voice, moist mucous membranes Neck:  Supple, without masses, thyroid non-tender Resp:  No accessory muscle use, clear breath sounds without wheezes rales or rhonchi 
Card:  No murmurs, normal S1, S2 without thrills, bruits or peripheral edema Abd:  Soft, non-tender, non-distended, normoactive bowel sounds are present, no mass Lymph:  No cervical or inguinal adenopathy Musc:  No cyanosis or clubbing Skin:  No rashes or ulcers, skin turgor is good Neuro:  Cranial nerves are grossly intact, Mild L side motor weakness, follows commands appropriately Psych:  Good insight, oriented to person, place and time, alert Telemetry reviewed:   normal sinus rhythm 
__________________________________________________________________ Medications Reviewed: (see below) Medications:  
 
Current Facility-Administered Medications Medication Dose Route Frequency  clopidogrel (PLAVIX) tablet 75 mg  75 mg Oral DAILY  sodium chloride (NS) flush 5-40 mL  5-40 mL IntraVENous Q8H  
 sodium chloride (NS) flush 5-40 mL  5-40 mL IntraVENous PRN  
 acetaminophen (TYLENOL) tablet 650 mg  650 mg Oral Q4H PRN  
 enoxaparin (LOVENOX) injection 40 mg  40 mg SubCUTAneous Q24H  carvedilol (COREG) tablet 3.125 mg  3.125 mg Oral BID WITH MEALS  furosemide (LASIX) tablet 20 mg  20 mg Oral DAILY  atorvastatin (LIPITOR) tablet 20 mg  20 mg Oral QHS Lab Data Reviewed: (see below) Lab Review:  
 
Recent Labs  
  01/20/19 
0204 01/19/19 
0344 WBC 5.1 4.5 HGB 11.7 11.9 HCT 34.4* 36.5 * 121* Recent Labs  
  01/20/19 
0204 01/19/19 
0344  143  
K 3.7 4.1  108 CO2 25 25 GLU 83 93 BUN 16 14 CREA 1.06* 0.81 CA 8.5 8.9 MG 2.0  --   
PHOS 3.7  -- No results found for: Karishma Esquivel No results for input(s): PH, PCO2, PO2, HCO3, FIO2 in the last 72 hours. No results for input(s): INR in the last 72 hours. No lab exists for component: INREXT All Micro Results None I have reviewed notes of prior 24hr. Other pertinent lab: none Total time spent with patient: 45 Minutes Care Plan discussed with: Patient, Care Manager, Nursing Staff, Consultant/Specialist and >50% of time spent in counseling and coordination of care Discussed:  Care Plan and D/C Planning Prophylaxis:  H2B/PPI Disposition:  [facility]/Rehab ___________________________________________________ Attending Physician: Rock Maddox MD

## 2019-01-22 LAB
ALBUMIN SERPL-MCNC: 2.6 G/DL (ref 3.5–5)
ALBUMIN/GLOB SERPL: 0.9 {RATIO} (ref 1.1–2.2)
ALP SERPL-CCNC: 61 U/L (ref 45–117)
ALT SERPL-CCNC: 9 U/L (ref 12–78)
ANION GAP SERPL CALC-SCNC: 10 MMOL/L (ref 5–15)
AST SERPL-CCNC: 29 U/L (ref 15–37)
BILIRUB SERPL-MCNC: 1.9 MG/DL (ref 0.2–1)
BUN SERPL-MCNC: 24 MG/DL (ref 6–20)
BUN/CREAT SERPL: 24 (ref 12–20)
CALCIUM SERPL-MCNC: 8.4 MG/DL (ref 8.5–10.1)
CHLORIDE SERPL-SCNC: 108 MMOL/L (ref 97–108)
CO2 SERPL-SCNC: 23 MMOL/L (ref 21–32)
CREAT SERPL-MCNC: 0.98 MG/DL (ref 0.55–1.02)
ERYTHROCYTE [DISTWIDTH] IN BLOOD BY AUTOMATED COUNT: 14.6 % (ref 11.5–14.5)
GLOBULIN SER CALC-MCNC: 2.9 G/DL (ref 2–4)
GLUCOSE SERPL-MCNC: 82 MG/DL (ref 65–100)
HCT VFR BLD AUTO: 35.9 % (ref 35–47)
HGB BLD-MCNC: 12.1 G/DL (ref 11.5–16)
MCH RBC QN AUTO: 33.1 PG (ref 26–34)
MCHC RBC AUTO-ENTMCNC: 33.7 G/DL (ref 30–36.5)
MCV RBC AUTO: 98.1 FL (ref 80–99)
NRBC # BLD: 0 K/UL (ref 0–0.01)
NRBC BLD-RTO: 0 PER 100 WBC
PLATELET # BLD AUTO: 121 K/UL (ref 150–400)
PMV BLD AUTO: 10.6 FL (ref 8.9–12.9)
POTASSIUM SERPL-SCNC: 4.2 MMOL/L (ref 3.5–5.1)
PROT SERPL-MCNC: 5.5 G/DL (ref 6.4–8.2)
RBC # BLD AUTO: 3.66 M/UL (ref 3.8–5.2)
SODIUM SERPL-SCNC: 141 MMOL/L (ref 136–145)
WBC # BLD AUTO: 4.7 K/UL (ref 3.6–11)

## 2019-01-22 PROCEDURE — 80053 COMPREHEN METABOLIC PANEL: CPT

## 2019-01-22 PROCEDURE — 36415 COLL VENOUS BLD VENIPUNCTURE: CPT

## 2019-01-22 PROCEDURE — 85027 COMPLETE CBC AUTOMATED: CPT

## 2019-01-24 LAB
ANION GAP SERPL CALC-SCNC: 8 MMOL/L (ref 5–15)
BUN SERPL-MCNC: 26 MG/DL (ref 6–20)
BUN/CREAT SERPL: 30 (ref 12–20)
CALCIUM SERPL-MCNC: 8.2 MG/DL (ref 8.5–10.1)
CHLORIDE SERPL-SCNC: 109 MMOL/L (ref 97–108)
CO2 SERPL-SCNC: 27 MMOL/L (ref 21–32)
CREAT SERPL-MCNC: 0.88 MG/DL (ref 0.55–1.02)
ERYTHROCYTE [DISTWIDTH] IN BLOOD BY AUTOMATED COUNT: 15.4 % (ref 11.5–14.5)
GLUCOSE SERPL-MCNC: 79 MG/DL (ref 65–100)
HCT VFR BLD AUTO: 32.7 % (ref 35–47)
HGB BLD-MCNC: 11.3 G/DL (ref 11.5–16)
MCH RBC QN AUTO: 34.5 PG (ref 26–34)
MCHC RBC AUTO-ENTMCNC: 34.6 G/DL (ref 30–36.5)
MCV RBC AUTO: 99.7 FL (ref 80–99)
NRBC # BLD: 0 K/UL (ref 0–0.01)
NRBC BLD-RTO: 0 PER 100 WBC
PLATELET # BLD AUTO: 111 K/UL (ref 150–400)
PMV BLD AUTO: 10.8 FL (ref 8.9–12.9)
POTASSIUM SERPL-SCNC: 3.8 MMOL/L (ref 3.5–5.1)
RBC # BLD AUTO: 3.28 M/UL (ref 3.8–5.2)
SODIUM SERPL-SCNC: 144 MMOL/L (ref 136–145)
WBC # BLD AUTO: 3.9 K/UL (ref 3.6–11)

## 2019-01-24 PROCEDURE — 80048 BASIC METABOLIC PNL TOTAL CA: CPT

## 2019-01-24 PROCEDURE — 85027 COMPLETE CBC AUTOMATED: CPT

## 2019-01-24 PROCEDURE — 36415 COLL VENOUS BLD VENIPUNCTURE: CPT

## 2019-01-28 LAB
ANION GAP SERPL CALC-SCNC: 7 MMOL/L (ref 5–15)
BUN SERPL-MCNC: 20 MG/DL (ref 6–20)
BUN/CREAT SERPL: 25 (ref 12–20)
CALCIUM SERPL-MCNC: 8.3 MG/DL (ref 8.5–10.1)
CHLORIDE SERPL-SCNC: 112 MMOL/L (ref 97–108)
CO2 SERPL-SCNC: 28 MMOL/L (ref 21–32)
CREAT SERPL-MCNC: 0.81 MG/DL (ref 0.55–1.02)
ERYTHROCYTE [DISTWIDTH] IN BLOOD BY AUTOMATED COUNT: 14.6 % (ref 11.5–14.5)
GLUCOSE SERPL-MCNC: 83 MG/DL (ref 65–100)
HCT VFR BLD AUTO: 32.6 % (ref 35–47)
HGB BLD-MCNC: 11 G/DL (ref 11.5–16)
MCH RBC QN AUTO: 33.4 PG (ref 26–34)
MCHC RBC AUTO-ENTMCNC: 33.7 G/DL (ref 30–36.5)
MCV RBC AUTO: 99.1 FL (ref 80–99)
NRBC # BLD: 0 K/UL (ref 0–0.01)
NRBC BLD-RTO: 0 PER 100 WBC
PLATELET # BLD AUTO: 107 K/UL (ref 150–400)
PMV BLD AUTO: 11.1 FL (ref 8.9–12.9)
POTASSIUM SERPL-SCNC: 3.7 MMOL/L (ref 3.5–5.1)
RBC # BLD AUTO: 3.29 M/UL (ref 3.8–5.2)
SODIUM SERPL-SCNC: 147 MMOL/L (ref 136–145)
WBC # BLD AUTO: 3.4 K/UL (ref 3.6–11)

## 2019-01-28 PROCEDURE — 85027 COMPLETE CBC AUTOMATED: CPT

## 2019-01-28 PROCEDURE — 80048 BASIC METABOLIC PNL TOTAL CA: CPT

## 2019-01-28 PROCEDURE — 36415 COLL VENOUS BLD VENIPUNCTURE: CPT

## 2019-01-31 LAB
ANION GAP SERPL CALC-SCNC: 8 MMOL/L (ref 5–15)
BUN SERPL-MCNC: 14 MG/DL (ref 6–20)
BUN/CREAT SERPL: 19 (ref 12–20)
CALCIUM SERPL-MCNC: 8.5 MG/DL (ref 8.5–10.1)
CHLORIDE SERPL-SCNC: 107 MMOL/L (ref 97–108)
CO2 SERPL-SCNC: 26 MMOL/L (ref 21–32)
CREAT SERPL-MCNC: 0.74 MG/DL (ref 0.55–1.02)
ERYTHROCYTE [DISTWIDTH] IN BLOOD BY AUTOMATED COUNT: 15.4 % (ref 11.5–14.5)
GLUCOSE SERPL-MCNC: 90 MG/DL (ref 65–100)
HCT VFR BLD AUTO: 33.4 % (ref 35–47)
HGB BLD-MCNC: 11.4 G/DL (ref 11.5–16)
MCH RBC QN AUTO: 33.6 PG (ref 26–34)
MCHC RBC AUTO-ENTMCNC: 34.1 G/DL (ref 30–36.5)
MCV RBC AUTO: 98.5 FL (ref 80–99)
NRBC # BLD: 0 K/UL (ref 0–0.01)
NRBC BLD-RTO: 0 PER 100 WBC
PLATELET # BLD AUTO: 134 K/UL (ref 150–400)
PMV BLD AUTO: 10.4 FL (ref 8.9–12.9)
POTASSIUM SERPL-SCNC: 4.1 MMOL/L (ref 3.5–5.1)
RBC # BLD AUTO: 3.39 M/UL (ref 3.8–5.2)
SODIUM SERPL-SCNC: 141 MMOL/L (ref 136–145)
WBC # BLD AUTO: 3.9 K/UL (ref 3.6–11)

## 2019-01-31 PROCEDURE — 36415 COLL VENOUS BLD VENIPUNCTURE: CPT

## 2019-01-31 PROCEDURE — 80048 BASIC METABOLIC PNL TOTAL CA: CPT

## 2019-01-31 PROCEDURE — 85027 COMPLETE CBC AUTOMATED: CPT

## 2019-02-10 ENCOUNTER — ANESTHESIA (OUTPATIENT)
Dept: SURGERY | Age: 84
DRG: 253 | End: 2019-02-10
Payer: MEDICARE

## 2019-02-10 ENCOUNTER — ANESTHESIA EVENT (OUTPATIENT)
Dept: SURGERY | Age: 84
DRG: 253 | End: 2019-02-10
Payer: MEDICARE

## 2019-02-10 ENCOUNTER — HOSPITAL ENCOUNTER (INPATIENT)
Age: 84
LOS: 1 days | Discharge: HOME | DRG: 253 | End: 2019-02-11
Attending: EMERGENCY MEDICINE
Payer: MEDICARE

## 2019-02-10 ENCOUNTER — APPOINTMENT (OUTPATIENT)
Dept: CT IMAGING | Age: 84
DRG: 253 | End: 2019-02-10
Attending: EMERGENCY MEDICINE
Payer: MEDICARE

## 2019-02-10 DIAGNOSIS — I70.90 ARTERIAL OCCLUSION: Primary | ICD-10-CM

## 2019-02-10 PROBLEM — I74.2 EMBOLUS OF BRACHIAL ARTERY (HCC): Status: ACTIVE | Noted: 2019-02-10

## 2019-02-10 LAB
ANION GAP SERPL CALC-SCNC: 6 MMOL/L (ref 5–15)
APTT PPP: 30.7 SEC (ref 22.1–32)
BASOPHILS # BLD: 0 K/UL (ref 0–0.1)
BASOPHILS NFR BLD: 1 % (ref 0–1)
BUN SERPL-MCNC: 12 MG/DL (ref 6–20)
BUN/CREAT SERPL: 13 (ref 12–20)
CALCIUM SERPL-MCNC: 8.6 MG/DL (ref 8.5–10.1)
CHLORIDE SERPL-SCNC: 109 MMOL/L (ref 97–108)
CK SERPL-CCNC: 71 U/L (ref 26–192)
CO2 SERPL-SCNC: 30 MMOL/L (ref 21–32)
CREAT SERPL-MCNC: 0.91 MG/DL (ref 0.55–1.02)
DIFFERENTIAL METHOD BLD: ABNORMAL
EOSINOPHIL # BLD: 0.1 K/UL (ref 0–0.4)
EOSINOPHIL NFR BLD: 2 % (ref 0–7)
ERYTHROCYTE [DISTWIDTH] IN BLOOD BY AUTOMATED COUNT: 15.1 % (ref 11.5–14.5)
GLUCOSE SERPL-MCNC: 97 MG/DL (ref 65–100)
HCT VFR BLD AUTO: 35.4 % (ref 35–47)
HGB BLD-MCNC: 12 G/DL (ref 11.5–16)
IMM GRANULOCYTES # BLD AUTO: 0 K/UL (ref 0–0.04)
IMM GRANULOCYTES NFR BLD AUTO: 0 % (ref 0–0.5)
INR PPP: 1.2 (ref 0.9–1.1)
LYMPHOCYTES # BLD: 0.3 K/UL (ref 0.8–3.5)
LYMPHOCYTES NFR BLD: 7 % (ref 12–49)
MCH RBC QN AUTO: 33.4 PG (ref 26–34)
MCHC RBC AUTO-ENTMCNC: 33.9 G/DL (ref 30–36.5)
MCV RBC AUTO: 98.6 FL (ref 80–99)
MONOCYTES # BLD: 0.3 K/UL (ref 0–1)
MONOCYTES NFR BLD: 7 % (ref 5–13)
NEUTS SEG # BLD: 4 K/UL (ref 1.8–8)
NEUTS SEG NFR BLD: 83 % (ref 32–75)
NRBC # BLD: 0 K/UL (ref 0–0.01)
NRBC BLD-RTO: 0 PER 100 WBC
PLATELET # BLD AUTO: 153 K/UL (ref 150–400)
PMV BLD AUTO: 9.8 FL (ref 8.9–12.9)
POTASSIUM SERPL-SCNC: 3.8 MMOL/L (ref 3.5–5.1)
PROTHROMBIN TIME: 12.4 SEC (ref 9–11.1)
RBC # BLD AUTO: 3.59 M/UL (ref 3.8–5.2)
RBC MORPH BLD: ABNORMAL
SODIUM SERPL-SCNC: 145 MMOL/L (ref 136–145)
THERAPEUTIC RANGE,PTTT: NORMAL SECS (ref 58–77)
WBC # BLD AUTO: 4.7 K/UL (ref 3.6–11)

## 2019-02-10 PROCEDURE — 96374 THER/PROPH/DIAG INJ IV PUSH: CPT

## 2019-02-10 PROCEDURE — 77030003704 HC NDL VASC ACC ARMD -A

## 2019-02-10 PROCEDURE — 86870 RBC ANTIBODY IDENTIFICATION: CPT

## 2019-02-10 PROCEDURE — 80048 BASIC METABOLIC PNL TOTAL CA: CPT

## 2019-02-10 PROCEDURE — 86900 BLOOD TYPING SEROLOGIC ABO: CPT

## 2019-02-10 PROCEDURE — 77030013060 HC DEV INFL PRSS MRTM -B

## 2019-02-10 PROCEDURE — 36415 COLL VENOUS BLD VENIPUNCTURE: CPT

## 2019-02-10 PROCEDURE — 86156 COLD AGGLUTININ SCREEN: CPT

## 2019-02-10 PROCEDURE — 74011000258 HC RX REV CODE- 258

## 2019-02-10 PROCEDURE — 86905 BLOOD TYPING RBC ANTIGENS: CPT

## 2019-02-10 PROCEDURE — 82550 ASSAY OF CK (CPK): CPT

## 2019-02-10 PROCEDURE — 74011250636 HC RX REV CODE- 250/636

## 2019-02-10 PROCEDURE — 76010000160 HC OR TIME 0.5 TO 1 HR INTENSV-TIER 1

## 2019-02-10 PROCEDURE — 86901 BLOOD TYPING SEROLOGIC RH(D): CPT

## 2019-02-10 PROCEDURE — 74011250637 HC RX REV CODE- 250/637

## 2019-02-10 PROCEDURE — C1769 GUIDE WIRE: HCPCS

## 2019-02-10 PROCEDURE — 77030002987 HC SUT PROL J&J -B

## 2019-02-10 PROCEDURE — 99285 EMERGENCY DEPT VISIT HI MDM: CPT

## 2019-02-10 PROCEDURE — 77030002933 HC SUT MCRYL J&J -A

## 2019-02-10 PROCEDURE — 65660000000 HC RM CCU STEPDOWN

## 2019-02-10 PROCEDURE — 86860 RBC ANTIBODY ELUTION: CPT

## 2019-02-10 PROCEDURE — 86978 RBC PRETREATMENT SERUM: CPT

## 2019-02-10 PROCEDURE — 77030018673

## 2019-02-10 PROCEDURE — 86850 RBC ANTIBODY SCREEN: CPT

## 2019-02-10 PROCEDURE — 77030018836 HC SOL IRR NACL ICUM -A

## 2019-02-10 PROCEDURE — 76210000006 HC OR PH I REC 0.5 TO 1 HR

## 2019-02-10 PROCEDURE — C1757 CATH, THROMBECTOMY/EMBOLECT: HCPCS

## 2019-02-10 PROCEDURE — 86880 COOMBS TEST DIRECT: CPT

## 2019-02-10 PROCEDURE — 77030034850

## 2019-02-10 PROCEDURE — 77030011640 HC PAD GRND REM COVD -A

## 2019-02-10 PROCEDURE — 77030002986 HC SUT PROL J&J -A

## 2019-02-10 PROCEDURE — 85025 COMPLETE CBC W/AUTO DIFF WBC: CPT

## 2019-02-10 PROCEDURE — 85610 PROTHROMBIN TIME: CPT

## 2019-02-10 PROCEDURE — 77030014647 HC SEAL FBRN TISSL BAXT -D

## 2019-02-10 PROCEDURE — 74011250636 HC RX REV CODE- 250/636: Performed by: EMERGENCY MEDICINE

## 2019-02-10 PROCEDURE — 77030031139 HC SUT VCRL2 J&J -A

## 2019-02-10 PROCEDURE — 93005 ELECTROCARDIOGRAM TRACING: CPT

## 2019-02-10 PROCEDURE — 85730 THROMBOPLASTIN TIME PARTIAL: CPT

## 2019-02-10 PROCEDURE — 77030008467 HC STPLR SKN COVD -B

## 2019-02-10 PROCEDURE — C1894 INTRO/SHEATH, NON-LASER: HCPCS

## 2019-02-10 PROCEDURE — 76060000032 HC ANESTHESIA 0.5 TO 1 HR

## 2019-02-10 PROCEDURE — 88304 TISSUE EXAM BY PATHOLOGIST: CPT

## 2019-02-10 PROCEDURE — 74011250636 HC RX REV CODE- 250/636: Performed by: ANESTHESIOLOGY

## 2019-02-10 PROCEDURE — 77030039266 HC ADH SKN EXOFIN S2SG -A

## 2019-02-10 PROCEDURE — 03C73ZZ EXTIRPATION OF MATTER FROM RIGHT BRACHIAL ARTERY, PERCUTANEOUS APPROACH: ICD-10-PCS

## 2019-02-10 RX ORDER — FUROSEMIDE 20 MG/1
20 TABLET ORAL DAILY
Status: DISCONTINUED | OUTPATIENT
Start: 2019-02-11 | End: 2019-02-11 | Stop reason: HOSPADM

## 2019-02-10 RX ORDER — SODIUM CHLORIDE 0.9 % (FLUSH) 0.9 %
5-40 SYRINGE (ML) INJECTION AS NEEDED
Status: DISCONTINUED | OUTPATIENT
Start: 2019-02-10 | End: 2019-02-11 | Stop reason: HOSPADM

## 2019-02-10 RX ORDER — DIPHENHYDRAMINE HYDROCHLORIDE 50 MG/ML
12.5 INJECTION, SOLUTION INTRAMUSCULAR; INTRAVENOUS AS NEEDED
Status: DISCONTINUED | OUTPATIENT
Start: 2019-02-10 | End: 2019-02-10 | Stop reason: HOSPADM

## 2019-02-10 RX ORDER — HYDROCODONE BITARTRATE AND ACETAMINOPHEN 5; 325 MG/1; MG/1
1 TABLET ORAL
Status: DISCONTINUED | OUTPATIENT
Start: 2019-02-10 | End: 2019-02-11 | Stop reason: HOSPADM

## 2019-02-10 RX ORDER — HEPARIN SODIUM 5000 [USP'U]/ML
5000 INJECTION, SOLUTION INTRAVENOUS; SUBCUTANEOUS
Status: COMPLETED | OUTPATIENT
Start: 2019-02-10 | End: 2019-02-10

## 2019-02-10 RX ORDER — FENTANYL CITRATE 50 UG/ML
INJECTION, SOLUTION INTRAMUSCULAR; INTRAVENOUS AS NEEDED
Status: DISCONTINUED | OUTPATIENT
Start: 2019-02-10 | End: 2019-02-10 | Stop reason: HOSPADM

## 2019-02-10 RX ORDER — FLUMAZENIL 0.1 MG/ML
0.2 INJECTION INTRAVENOUS
Status: DISCONTINUED | OUTPATIENT
Start: 2019-02-10 | End: 2019-02-10 | Stop reason: HOSPADM

## 2019-02-10 RX ORDER — ONDANSETRON 2 MG/ML
4 INJECTION INTRAMUSCULAR; INTRAVENOUS
Status: DISCONTINUED | OUTPATIENT
Start: 2019-02-10 | End: 2019-02-11 | Stop reason: HOSPADM

## 2019-02-10 RX ORDER — ACETAMINOPHEN 325 MG/1
650 TABLET ORAL
Status: DISCONTINUED | OUTPATIENT
Start: 2019-02-10 | End: 2019-02-11 | Stop reason: HOSPADM

## 2019-02-10 RX ORDER — SODIUM CHLORIDE 0.9 % (FLUSH) 0.9 %
5-40 SYRINGE (ML) INJECTION EVERY 8 HOURS
Status: DISCONTINUED | OUTPATIENT
Start: 2019-02-10 | End: 2019-02-11 | Stop reason: HOSPADM

## 2019-02-10 RX ORDER — NALOXONE HYDROCHLORIDE 0.4 MG/ML
0.2 INJECTION, SOLUTION INTRAMUSCULAR; INTRAVENOUS; SUBCUTANEOUS
Status: DISCONTINUED | OUTPATIENT
Start: 2019-02-10 | End: 2019-02-10 | Stop reason: HOSPADM

## 2019-02-10 RX ORDER — SODIUM CHLORIDE, SODIUM LACTATE, POTASSIUM CHLORIDE, CALCIUM CHLORIDE 600; 310; 30; 20 MG/100ML; MG/100ML; MG/100ML; MG/100ML
125 INJECTION, SOLUTION INTRAVENOUS CONTINUOUS
Status: DISCONTINUED | OUTPATIENT
Start: 2019-02-10 | End: 2019-02-10 | Stop reason: HOSPADM

## 2019-02-10 RX ORDER — LIDOCAINE HYDROCHLORIDE 20 MG/ML
INJECTION, SOLUTION EPIDURAL; INFILTRATION; INTRACAUDAL; PERINEURAL AS NEEDED
Status: DISCONTINUED | OUTPATIENT
Start: 2019-02-10 | End: 2019-02-10 | Stop reason: HOSPADM

## 2019-02-10 RX ORDER — CARVEDILOL 3.12 MG/1
3.12 TABLET ORAL 2 TIMES DAILY WITH MEALS
Status: DISCONTINUED | OUTPATIENT
Start: 2019-02-10 | End: 2019-02-11 | Stop reason: HOSPADM

## 2019-02-10 RX ORDER — DEXTROSE MONOHYDRATE AND SODIUM CHLORIDE 5; .45 G/100ML; G/100ML
50 INJECTION, SOLUTION INTRAVENOUS CONTINUOUS
Status: DISCONTINUED | OUTPATIENT
Start: 2019-02-10 | End: 2019-02-11 | Stop reason: HOSPADM

## 2019-02-10 RX ORDER — LIDOCAINE HYDROCHLORIDE 10 MG/ML
0.1 INJECTION, SOLUTION EPIDURAL; INFILTRATION; INTRACAUDAL; PERINEURAL AS NEEDED
Status: DISCONTINUED | OUTPATIENT
Start: 2019-02-10 | End: 2019-02-10 | Stop reason: HOSPADM

## 2019-02-10 RX ORDER — HYDROMORPHONE HYDROCHLORIDE 1 MG/ML
.25-1 INJECTION, SOLUTION INTRAMUSCULAR; INTRAVENOUS; SUBCUTANEOUS
Status: DISCONTINUED | OUTPATIENT
Start: 2019-02-10 | End: 2019-02-10 | Stop reason: HOSPADM

## 2019-02-10 RX ORDER — CEFAZOLIN SODIUM/WATER 2 G/20 ML
2 SYRINGE (ML) INTRAVENOUS ONCE
Status: ACTIVE | OUTPATIENT
Start: 2019-02-10 | End: 2019-02-10

## 2019-02-10 RX ORDER — MORPHINE SULFATE 4 MG/ML
2 INJECTION INTRAVENOUS
Status: DISCONTINUED | OUTPATIENT
Start: 2019-02-10 | End: 2019-02-11 | Stop reason: HOSPADM

## 2019-02-10 RX ORDER — PROPOFOL 10 MG/ML
INJECTION, EMULSION INTRAVENOUS
Status: DISCONTINUED | OUTPATIENT
Start: 2019-02-10 | End: 2019-02-10 | Stop reason: HOSPADM

## 2019-02-10 RX ORDER — CEFAZOLIN SODIUM 1 G/3ML
INJECTION, POWDER, FOR SOLUTION INTRAMUSCULAR; INTRAVENOUS AS NEEDED
Status: DISCONTINUED | OUTPATIENT
Start: 2019-02-10 | End: 2019-02-10 | Stop reason: HOSPADM

## 2019-02-10 RX ORDER — ENOXAPARIN SODIUM 100 MG/ML
70 INJECTION SUBCUTANEOUS EVERY 12 HOURS
Status: DISCONTINUED | OUTPATIENT
Start: 2019-02-10 | End: 2019-02-11 | Stop reason: HOSPADM

## 2019-02-10 RX ORDER — ENOXAPARIN SODIUM 100 MG/ML
60 INJECTION SUBCUTANEOUS EVERY 12 HOURS
Status: DISCONTINUED | OUTPATIENT
Start: 2019-02-10 | End: 2019-02-10 | Stop reason: DRUGHIGH

## 2019-02-10 RX ORDER — SODIUM CHLORIDE, SODIUM LACTATE, POTASSIUM CHLORIDE, CALCIUM CHLORIDE 600; 310; 30; 20 MG/100ML; MG/100ML; MG/100ML; MG/100ML
INJECTION, SOLUTION INTRAVENOUS
Status: DISCONTINUED | OUTPATIENT
Start: 2019-02-10 | End: 2019-02-10 | Stop reason: HOSPADM

## 2019-02-10 RX ADMIN — CEFAZOLIN SODIUM 2 G: 1 INJECTION, POWDER, FOR SOLUTION INTRAMUSCULAR; INTRAVENOUS at 11:05

## 2019-02-10 RX ADMIN — SODIUM CHLORIDE, SODIUM LACTATE, POTASSIUM CHLORIDE, CALCIUM CHLORIDE: 600; 310; 30; 20 INJECTION, SOLUTION INTRAVENOUS at 10:56

## 2019-02-10 RX ADMIN — FENTANYL CITRATE 25 MCG: 50 INJECTION, SOLUTION INTRAMUSCULAR; INTRAVENOUS at 11:23

## 2019-02-10 RX ADMIN — DEXTROSE MONOHYDRATE AND SODIUM CHLORIDE 50 ML/HR: 5; .45 INJECTION, SOLUTION INTRAVENOUS at 12:04

## 2019-02-10 RX ADMIN — Medication 10 ML: at 14:23

## 2019-02-10 RX ADMIN — Medication 10 ML: at 14:22

## 2019-02-10 RX ADMIN — LIDOCAINE HYDROCHLORIDE 40 MG: 20 INJECTION, SOLUTION EPIDURAL; INFILTRATION; INTRACAUDAL; PERINEURAL at 11:08

## 2019-02-10 RX ADMIN — Medication 10 ML: at 21:42

## 2019-02-10 RX ADMIN — PROPOFOL 50 MCG/KG/MIN: 10 INJECTION, EMULSION INTRAVENOUS at 11:08

## 2019-02-10 RX ADMIN — CARVEDILOL 3.12 MG: 3.12 TABLET, FILM COATED ORAL at 16:49

## 2019-02-10 RX ADMIN — FENTANYL CITRATE 25 MCG: 50 INJECTION, SOLUTION INTRAMUSCULAR; INTRAVENOUS at 11:03

## 2019-02-10 RX ADMIN — HEPARIN SODIUM 5000 UNITS: 5000 INJECTION, SOLUTION INTRAVENOUS; SUBCUTANEOUS at 10:18

## 2019-02-10 RX ADMIN — ENOXAPARIN SODIUM 70 MG: 80 INJECTION SUBCUTANEOUS at 18:00

## 2019-02-10 RX ADMIN — FENTANYL CITRATE 25 MCG: 50 INJECTION, SOLUTION INTRAMUSCULAR; INTRAVENOUS at 11:06

## 2019-02-10 NOTE — BRIEF OP NOTE
BRIEF OPERATIVE NOTE Date of Procedure: 2/10/2019 Preoperative Diagnosis: PULSELESS ARM Postoperative Diagnosis: BRACHIAL CLOT Procedure(s): RIGHT ARM BRACHIAL EMBOLECTOMY Surgeon(s) and Role: 
   Chip Shields MD - Primary Surgical Assistant: Gregorio Arias Surgical Staff: 
Circ-1: Feliz Plummer RN Scrub Tech-1: Keli Caro Surg Asst-1: Geoff Gamble LPN Event Time In Time Out Incision Start 1119 Incision Close 1143 Anesthesia: MAC Estimated Blood Loss: minimal 
Specimens:  
ID Type Source Tests Collected by Time Destination 1 : brachial artery embolus Preservative Artery  Brandi Vasquez MD 2/10/2019 1131 Pathology Findings: clot in brachial artery Complications: none Implants: * No implants in log *

## 2019-02-10 NOTE — PERIOP NOTES
TRANSFER - IN REPORT: 
 
Verbal report received from [facility] on Carma Schilder  being received from ED for ordered procedure Report consisted of patients Situation, Background, Assessment and  
Recommendations(SBAR). Information from the following report(s) SBAR, ED Summary and MAR was reviewed with the receiving nurse. Opportunity for questions and clarification was provided. Assessment completed upon patients arrival to unit and care assumed.

## 2019-02-10 NOTE — ED NOTES
TRANSFER - OUT REPORT: 
 
Verbal report given to [facility] RN(name) on Esther Current  being transferred to Preop OR PACU(unit) for ordered procedure Report consisted of patients Situation, Background, Assessment and  
Recommendations(SBAR). Information from the following report(s) SBAR, MAR was reviewed with the receiving nurse. Lines:    
 
Opportunity for questions and clarification was provided. Patient transported with: NSL Monitoring x4 OR transport.

## 2019-02-10 NOTE — ROUTINE PROCESS
TRANSFER - OUT REPORT: 
 
Verbal report given to [employer] on Jayden Prince  being transferred to [facility] for routine post - op Report consisted of patients Situation, Background, Assessment and  
Recommendations(SBAR). Information from the following report(s) SBAR, ED Summary, OR Summary and Cardiac Rhythm NSR w PVC was reviewed with the receiving nurse. Lines:  
Peripheral IV 02/10/19 Left Antecubital (Active) Site Assessment Clean, dry, & intact 2/10/2019 12:00 PM  
Phlebitis Assessment 0 2/10/2019 12:00 PM  
Infiltration Assessment 0 2/10/2019 12:00 PM  
Dressing Status Clean, dry, & intact 2/10/2019 12:00 PM  
Dressing Type Tape;Transparent 2/10/2019 12:00 PM  
Hub Color/Line Status Pink; Infusing 2/10/2019 12:00 PM  
   
Peripheral IV 02/10/19 Left;Posterior Hand (Active) Site Assessment Clean, dry, & intact 2/10/2019 12:08 PM  
Phlebitis Assessment 0 2/10/2019 12:08 PM  
Infiltration Assessment 0 2/10/2019 12:08 PM  
Dressing Status Intact; New drainage 2/10/2019 12:08 PM  
Dressing Type Transparent;Tape 2/10/2019 12:08 PM  
Hub Color/Line Status Green; Infusing 2/10/2019 12:08 PM  
  
 
Opportunity for questions and clarification was provided. Patient transported with: 
 Monitor Registered Nurse

## 2019-02-10 NOTE — PROGRESS NOTES
Vascular: 
 
Patient with acute onset numbness and weakness right arm this AM - found to have pulseless right arm on presentation to [facility] and transferred here. Recent hospitalization here for right hemispheric stroke - DC'ed on Plavix. Currently with intact motor function but absent pulses right arm - plan admission for embolectomy

## 2019-02-10 NOTE — ED NOTES
Bedside and Verbal shift change report given to Santosh Azar (oncoming nurse) by Javan Reece (offgoing nurse). Report included the following information SBAR and ED Summary.

## 2019-02-10 NOTE — PROGRESS NOTES
Verified with MD [id number] Zulema De La Fuente to give first postop dose of Lovenox 70mg 6pm this evening.

## 2019-02-10 NOTE — ANESTHESIA PREPROCEDURE EVALUATION
Anesthetic History No history of anesthetic complications Review of Systems / Medical History Patient summary reviewed and pertinent labs reviewed Pulmonary Within defined limits Neuro/Psych  
 
 
CVA (left sided weakness, d/c'ed home on plavix 1 week ago) Cardiovascular Hypertension Valvular problems/murmurs (moderate AS): aortic stenosis CHF Exercise tolerance: >4 METS Comments: EF 60% GI/Hepatic/Renal 
Within defined limits Endo/Other Arthritis Other Findings Physical Exam 
 
Airway Mallampati: II 
TM Distance: 4 - 6 cm Neck ROM: normal range of motion Mouth opening: Normal 
 
 Cardiovascular Rhythm: regular Rate: normal 
 
 
 
 Dental 
 
Dentition: Lower partial plate and Upper partial plate Pulmonary Breath sounds clear to auscultation Abdominal 
 
 
 
 Other Findings Anesthetic Plan ASA: 3, emergent Anesthesia type: MAC Induction: Intravenous Anesthetic plan and risks discussed with: Patient

## 2019-02-10 NOTE — ED TRIAGE NOTES
Pt arrives by helicopter from [address] [facility] in [place], pt woke up at 0500 this morning with right arm \"heaviness\" pt went to [facility] and worked up for stroke, stroke scale negative but right arm was pulseless.

## 2019-02-10 NOTE — ED PROVIDER NOTES
HPI  
 
Pt is a 80 y.o. F with PMH of HTN, aortic stenosis and CVA here from [facility] ED for numbness in RUE concerning for arterial thrombus. Pt says she woke from sleep at 5 AM with numbness to right arm and says she feels like arm is dead especially her hand and fingers. .  She denies any pain. No pulse palpable at OSH thus she was air lifted here. She takes plavix daily but not today and no ASA or heparin given PTA. No other complaints at this time. Past Medical History:  
Diagnosis Date  Aortic stenosis  CHF NYHA class II, chronic, diastolic ([facility] [address]  HTN (hypertension)  Hx of completed stroke History reviewed. No pertinent surgical history. Family History:  
Problem Relation Age of Onset  Heart Disease Sister Social History Socioeconomic History  Marital status: SINGLE Spouse name: Not on file  Number of children: Not on file  Years of education: Not on file  Highest education level: Not on file Social Needs  Financial resource strain: Not on file  Food insecurity - worry: Not on file  Food insecurity - inability: Not on file  Transportation needs - medical: Not on file  Transportation needs - non-medical: Not on file Occupational History  Not on file Tobacco Use  Smoking status: Never Smoker  Smokeless tobacco: Never Used Substance and Sexual Activity  Alcohol use: No  
  Frequency: Never  Drug use: No  
 Sexual activity: No  
Other Topics Concern  Not on file Social History Narrative  Not on file ALLERGIES: Erythromycin and Sulfa (sulfonamide antibiotics) Review of Systems Musculoskeletal: Negative for arthralgias and myalgias. Neurological: Positive for numbness. Negative for weakness. All other systems reviewed and are negative. Vitals:  
 02/10/19 3053 BP: 149/76 Pulse: 82 Resp: 13 Temp: 98.9 °F (37.2 °C) SpO2: 94% Weight: 75 kg (165 lb 4.8 oz) Height: 5' 7\" (1.702 m) Physical Exam  
Constitutional: She is oriented to person, place, and time. She appears well-developed and well-nourished. No distress. HENT:  
Head: Normocephalic and atraumatic. Mouth/Throat: Oropharynx is clear and moist.  
Eyes: EOM are normal. Pupils are equal, round, and reactive to light. Neck: Normal range of motion. Cardiovascular: Normal rate and regular rhythm. Exam reveals decreased pulses. Pulses: 
     Radial pulses are 0 on the right side. No palpable pulse to radial 
Doppler initially heard but fades and does not return. Brachial heard steadily on doppler. It does does fade but returns. Arm non-tender. Compartments soft. Arm not discolored. Right hand cold and red Sensation intact Strength RUE 5/5 Pulmonary/Chest: Effort normal and breath sounds normal.  
Abdominal: Soft. There is no tenderness. Musculoskeletal: Normal range of motion. She exhibits no edema, tenderness or deformity. Neurological: She is alert and oriented to person, place, and time. No cranial nerve deficit or sensory deficit (RUE). Skin: She is not diaphoretic. There is erythema (discoloration of right hand). Cold right hand. Psychiatric: She has a normal mood and affect. MDM Procedures Spoke with Dr. Stevan Esparza (vascular surgery) who advises cancel CTA arm and give heparin bolus and pre-op her for OR. Pt advised of the need for emergent OR. Pt admitted. She is stable in ED.  
 
Nalini Ferraro MD

## 2019-02-10 NOTE — H&P
Antonio Salas [facility] HISTORY AND PHYSICAL Name:  Yolanda Martinez 
MR#:  [id number] :  1929 ACCOUNT #:  [de-identified] ADMIT DATE:  02/10/2019 REASON FOR ADMISSION:  Right brachial artery embolus. HISTORY:  The patient is an 69-year-old female who was awakened this morning with a 
cold numb right arm. She was seen in the [facility] Emergency Room and found to have 
absent pulses in the right arm and transferred to the [place] [facility]. At 
present, she denies pain in her arm, and she had increased function of the arm, but 
it still feels heavy and useless and numb. She was recently hospitalized here at [place] for right hemispheric stroke and discharged home several weeks ago. She had 
left-sided weakness, which is now resolved. She was discharged on Plavix. She has 
no history of cardiac arrhythmias or previous vascular or embolic events other than 
her recent stroke. She lives alone at home, but is here with her son and 
daughter-in-law. PAST MEDICAL HISTORY:  Previous stroke, hypertension. ADMISSION MEDICATIONS:  Plavix, Coreg, Lasix. ALLERGIES:  ERYTHROMYCIN AND SULFA DRUGS. SOCIAL HISTORY:  The patient lives in [place], [place]. She is single. Her closest 
family member is her son. FAMILY HISTORY:  Unremarkable. REVIEW OF SYSTEMS:  She has had no recent cardiac, respiratory, GI, , hematologic, 
or psychiatric complaints. PHYSICAL EXAMINATION: 
GENERAL:  She is an elderly female in no distress. She is awake, alert, and 
responsive. HEENT:  Head within normal limits. NECK:  No masses. LUNGS:  Clear. HEART:  Regular rate and rhythm. ABDOMEN:  Soft and nontender. EXTREMITIES:  She has no palpable pulses in the right arm. Her right arm and hand 
are viable with intact motor function. She has a palpable left radial artery pulse 
and palpable femoral and pedal pulses bilaterally. NEUROLOGIC:  Grossly normal with intact motor and sensory function. IMPRESSION:  The patient appears to have had an acute arterial embolus to the right 
arm with resulting ischemic symptoms. She will be given a heparin bolus in the 
Emergency Department, admitted and taken to the operating room for brachial 
embolectomy. She and her family agree with this plan of treatment. Mora Valdes MD 
 
 
[id number] 
D:  02/10/2019 10:32 
T:  02/10/2019 12:33 JOB #:  [id number]

## 2019-02-10 NOTE — PROGRESS NOTES
Admission Medication Reconciliation: 
 
Comments/Recommendations: 
-Medication history obtained in the emergency department (room 02) -Confirmed allergy history and updated preferred pharmacy location 
-Patient took herself off of atorvastatin (this was prescribed but she never took it) due to concerns that it would be more harmful than beneficial 
-Patient no longer takes baby aspirin daily and no longer takes lisinopril as directed by a physician Medications added: None Medications removed: Atorvastatin Medications changed: None Information obtained from: Patient and patient's family members, prescription bottles in the room, review of RxQuery Significant PMH/Disease States:  
Past Medical History:  
Diagnosis Date  Aortic stenosis  CHF NYHA class II, chronic, diastolic ([address]  HTN (hypertension)  Hx of completed stroke Chief Complaint for this Admission: Chief Complaint Patient presents with  Circulatory problem Allergies:  Erythromycin and Sulfa (sulfonamide antibiotics) Prior to Admission Medications:  
Prior to Admission Medications Prescriptions Last Dose Informant Patient Reported? Taking?  
carvedilol (COREG) 3.125 mg tablet 2/9/2019 at PM Family Member Yes Yes Sig: Take 3.125 mg by mouth two (2) times daily (with meals). clopidogrel (PLAVIX) 75 mg tab 2/9/2019 at AM  No Yes Sig: Take 1 Tab by mouth daily for 30 days. furosemide (LASIX) 20 mg tablet 2/9/2019 at AM Family Member Yes Yes Sig: Take 20 mg by mouth daily. Facility-Administered Medications: None Thank you, 
Mckenzie Perry, PHARMD

## 2019-02-10 NOTE — ANESTHESIA POSTPROCEDURE EVALUATION
Procedure(s): RIGHT ARM BRACHIAL EMBOLECTOMY. Anesthesia Post Evaluation Multimodal analgesia: multimodal analgesia used between 6 hours prior to anesthesia start to PACU discharge Patient location during evaluation: bedside Patient participation: complete - patient participated Level of consciousness: awake Pain management: adequate Airway patency: patent Anesthetic complications: no 
Cardiovascular status: acceptable Respiratory status: acceptable Hydration status: acceptable Visit Vitals /64 Pulse 71 Temp (P) 36.6 °C (97.9 °F) Resp 10 Ht 5' 7\" (1.702 m) Wt 75 kg (165 lb 4.8 oz) SpO2 94% BMI 25.89 kg/m²

## 2019-02-11 VITALS
RESPIRATION RATE: 16 BRPM | TEMPERATURE: 98.2 F | BODY MASS INDEX: 25.94 KG/M2 | HEIGHT: 67 IN | HEART RATE: 75 BPM | OXYGEN SATURATION: 93 % | WEIGHT: 165.3 LBS | SYSTOLIC BLOOD PRESSURE: 121 MMHG | DIASTOLIC BLOOD PRESSURE: 70 MMHG

## 2019-02-11 LAB
ATRIAL RATE: 85 BPM
CALCULATED P AXIS, ECG09: 77 DEGREES
CALCULATED R AXIS, ECG10: 21 DEGREES
CALCULATED T AXIS, ECG11: 32 DEGREES
DIAGNOSIS, 93000: NORMAL
P-R INTERVAL, ECG05: 206 MS
Q-T INTERVAL, ECG07: 404 MS
QRS DURATION, ECG06: 88 MS
QTC CALCULATION (BEZET), ECG08: 480 MS
VENTRICULAR RATE, ECG03: 85 BPM

## 2019-02-11 PROCEDURE — 74011250636 HC RX REV CODE- 250/636

## 2019-02-11 PROCEDURE — 74011250637 HC RX REV CODE- 250/637

## 2019-02-11 PROCEDURE — 74011000258 HC RX REV CODE- 258

## 2019-02-11 RX ORDER — HYDROCODONE BITARTRATE AND ACETAMINOPHEN 5; 325 MG/1; MG/1
1 TABLET ORAL
Qty: 20 TAB | Refills: 0 | Status: SHIPPED | OUTPATIENT
Start: 2019-02-11 | End: 2019-02-27 | Stop reason: SINTOL

## 2019-02-11 RX ADMIN — ENOXAPARIN SODIUM 70 MG: 80 INJECTION SUBCUTANEOUS at 06:30

## 2019-02-11 RX ADMIN — CARVEDILOL 3.12 MG: 3.12 TABLET, FILM COATED ORAL at 10:58

## 2019-02-11 RX ADMIN — FUROSEMIDE 20 MG: 20 TABLET ORAL at 10:58

## 2019-02-11 RX ADMIN — DEXTROSE MONOHYDRATE AND SODIUM CHLORIDE 50 ML/HR: 5; .45 INJECTION, SOLUTION INTRAVENOUS at 07:53

## 2019-02-11 RX ADMIN — Medication 10 ML: at 06:51

## 2019-02-11 NOTE — PROGRESS NOTES
Phlebotomy Jacquelyn notified per lab that we will need 3 additional pink tubes, nurse notified Fouzia Manzano.

## 2019-02-11 NOTE — PHYSICIAN ADVISORY
Short Stay Review Pt Name:  Charlie Walters MR#  [id number] CSN#   [id number] Room and Hospital  430/01  @ [facility] Hospitalization date  2/10/2019  8:45 AM 
No discharge date for patient encounter. Current Attending Physician  Melba Caban MD  
 
A discharge order has been placed for this episode of hospital care for Ms. Charlie Walters; since this hospital stay is less than two midnights, I reviewed Ms. Huntley Kocher chart. Ms. Huntley Kocher healthcare insurance/benefit include: 
Payor: [employer] MED[employer]ARE / Plan: [address] / Product Type: Medicare /  
 
Utilization Review related case summary:  
Age  80 y.o.  
BMI  Body mass index is 25.89 kg/m². PMHx includes  Recent CVA with left sided weakness, Hospital course  The pt was hospitalized for acute Brachial artery thrombus and she went through thrombectomy with improvement. Risk of deterioration at the time this patient  was hospitalized  High On the basis of chart review, this patient's hospitalization status     
is appropriate for INPATIENT Jesse Olivarez MD MPH FACP Cell : [phone] Physician Advisor Tyler  [address]  
Utilization Review, Care Management CSN:  [id number] DENNY:   [id number] Admitted on :  2/10/2019 Discharge order

## 2019-02-11 NOTE — PROGRESS NOTES
2/11/2019 11:33 AM Met with pt and pt's son very briefly before pt was being transported out for discharge. Pt's son confirmed address and reported pt was open to [facility] prior to admission and requested they resume at discharge. Resume referral and discharge sent to [facility] via [employer]. Notified pt discharged home today. DANNY MiW

## 2019-02-11 NOTE — PROGRESS NOTES
Discharge instructions reviewed with and copy given to patient along with prescription. Patient informed to  eliquis prescription at [address] [place] [address].

## 2019-02-11 NOTE — DISCHARGE INSTRUCTIONS
Patient Discharge Instructions    Patricia Scott / [id number] : 1929    Admitted 2/10/2019 Discharged: 2019     Take Home Medications            · It is important that you take the medication exactly as they are prescribed. · Keep your medication in the bottles provided by the pharmacist and keep a list of the medication names, dosages, and times to be taken in your wallet. · Do not take other medications without consulting your doctor. What to do at Home    Recommended diet: Regular Diet,     Recommended activity: Activity as tolerated,      Follow-up with Dr Mine Graham in 2 weeks        Information obtained by :  I understand that if any problems occur once I am at home I am to contact my physician. I understand and acknowledge receipt of the instructions indicated above.                                                                                                                                            Physician's or R.N.'s Signature                                                                  Date/Time                                                                                                                                              Patient or Representative Signature                                                          Date/Time

## 2019-02-11 NOTE — PROGRESS NOTES
Bedside and Verbal shift change report given to [address] [place] [address] (oncoming nurse) by Swati Alcazar (offgoing nurse).  Report included the following information SBAR, Kardex and Recent Results.

## 2019-02-11 NOTE — PROGRESS NOTES
Bedside and Verbal shift change report given to Gurdeep Stewart (oncoming nurse) by Eva Claros RN (offgoing nurse). Report included the following information SBAR, Kardex, OR Summary, Procedure Summary, Intake/Output, MAR and Recent Results.

## 2019-02-11 NOTE — OP NOTES
Antonio Salas [facility]  OPERATIVE REPORT    Name:  Odalis Gale  MR#:  [id number]  :  1929  ACCOUNT #:  [de-identified]  DATE OF SERVICE:  02/10/2019    PREOPERATIVE DIAGNOSIS:  Right brachial artery embolus. POSTOPERATIVE DIAGNOSIS:  Right brachial artery embolus. PROCEDURE PERFORMED:  Right brachial embolectomy. SURGEON:  MD Amanda Vu    ANESTHESIA:  Local with sedation. COMPLICATIONS:  None. SPECIMENS REMOVED:  Brachial artery embolus. IMPLANTS:  None. ESTIMATED BLOOD LOSS:  Minimal.     INDICATIONS:  This patient is an 80-year-old female who awoke this morning with a  cold, numb, weak right arm. She was found to have absent pulses in the arm  consistent with embolus to the right brachial artery. She is in normal sinus rhythm  on EKG. She will undergo brachial embolectomy. PROCEDURE:  The patient's right arm was prepped and draped. A transverse incision  was made at the antecubital level. The brachial artery is identified and dissected  free. It was controlled with vessel loops and opened transversely. The patient had  been previously heparinized in the emergency department. There was no clot within  the brachial artery at this level. A #3 Marco catheter was passed proximally with  return of clot and good arterial inflow. The catheter was passed distally with no  clot returned. The arteriotomy was then closed with running 6-0 Prolene. Following  this, there was a good pulse in the brachial artery and a good pulse in the radial  artery at the wrist.  The incision was then closed with Vicryl subcutaneous suture  and Monocryl subcuticular skin closure. Dressings were applied and the patient was  returned to the recovery room in stable condition.         Geoff Kenney MD    [id number]  D:  02/10/2019 11:52  T:  02/10/2019 21:28  JOB #:  [id number]

## 2019-02-14 LAB
ABO + RH BLD: NORMAL
ANTI-COMPLEMENT (C3B,C3D): NORMAL
ANTIGENS PRESENT BLD: NORMAL
BLOOD GROUP ANTIBODIES SERPL: NORMAL
BLOOD GROUP ANTIBODIES SERPL: NORMAL
DAT IGG-SP REAG RBC QL: NORMAL
DAT POLY-SP REAG RBC QL: NORMAL
SPECIMEN EXP DATE BLD: NORMAL

## 2019-02-14 NOTE — DISCHARGE SUMMARY
[address] SUMMARY    Name:  Antolin Queen  MR#:  [id number]  :  1929  ACCOUNT #:  [de-identified]  ADMIT DATE:  02/10/2019  DISCHARGE DATE:  2019      ADMISSION DIAGNOSIS:  Brachial embolus. DISCHARGE DIAGNOSIS:  Brachial embolus. PROCEDURE:  Brachial embolectomy done by Dr. Jaja Yan on the date of admission. DETAILS OF ADMISSION/HOSPITAL COURSE:  The patient is an elderly female with an  embolus to her arm which required urgent surgical rectification. She tolerated this  without complication. As this is the second similar episode, she was started on  Eliquis and discharged home in good condition. DISCHARGE DISPOSITION:  Home. DISCHARGE CONDITION:  Good. DISCHARGE INSTRUCTIONS:  The patient will follow up with me in recheck or sooner for  acute changes or problems.       MD MJ Miller/[id number]/  D:  2019 7:26  T:  2019 1:55  JOB #:  [id number]

## 2019-02-27 ENCOUNTER — OFFICE VISIT (OUTPATIENT)
Dept: NEUROLOGY | Age: 84
End: 2019-02-27

## 2019-02-27 VITALS
RESPIRATION RATE: 20 BRPM | DIASTOLIC BLOOD PRESSURE: 84 MMHG | HEIGHT: 67 IN | SYSTOLIC BLOOD PRESSURE: 106 MMHG | BODY MASS INDEX: 25.89 KG/M2 | OXYGEN SATURATION: 95 % | HEART RATE: 86 BPM

## 2019-02-27 DIAGNOSIS — R53.1 LEFT-SIDED WEAKNESS: ICD-10-CM

## 2019-02-27 DIAGNOSIS — Z86.73 HISTORY OF CVA (CEREBROVASCULAR ACCIDENT): Primary | ICD-10-CM

## 2019-02-27 DIAGNOSIS — I74.2: ICD-10-CM

## 2019-02-27 NOTE — PROGRESS NOTES
Date:  19     Name:  Archana Silveira  :  1929  MRN:  [id number]     PCP:  Hallie Real MD    Chief Complaint   Patient presents with    Follow-up     stroke      HISTORY OF PRESENT ILLNESS: Ms. Jacquie Ferrer is an 81yo, right handed,  woman who presents today for follow up evaluation following an acute CVA for which she was hospitalized from 2019 through 2019. She apparently presented to [facility] via EMS with left sided weakness and NIH score of 6. CTA of the head and neck was without acute findings. She was transferred to [address] [place] [address] due to lack of neurology coverage at her community hospital. The brain MRI did show a moderate size right occipital ischemic stroke extending into right posterior temporal lobe. MRA Brain and Carotid doppler did not show any significant abnormalities. She has been changed from ASA to Plavix 75 mg/ day, and Lipitor 20 mg QHS added for LDL at 72. 2Decho was completed with EF 60% and bilateral atrial dilatation. No left to right or right to left shunt. Aortic and mitral valve stenosis. She was readmitted February 10, 2019, due to a brachial embolus and had embolectomy for this and was discharged . She was discharged on Eliquis. She has not had any additional symptoms of possible stroke. She indicates that she has no noticeable residual.  She did see an ophthalmologist who also indicated that her eyesight was fine and had no visual field deficit. Except as noted above, denies  fever, chills, cough. No CP or SOB. No dysuria, loss of bowel or bladder control. No Weight loss. Appetite good. Sleeping well. No sweats. No edema. No bruising or bleeding. No nausea or vomit. No diarrhea. No frequency, urgency, No depressive sxs. No anxiety. Denies sore throat, nasal congestion, nasal discharge, epistaxis, tinnitus, hearing loss, back pain, muscle pain, or joint pain.        Current Outpatient Medications   Medication Sig    apixaban (ELIQUIS) 2.5 mg tablet Take 1 Tab by mouth two (2) times a day.  carvedilol (COREG) 3.125 mg tablet Take 3.125 mg by mouth two (2) times daily (with meals).  furosemide (LASIX) 20 mg tablet Take 20 mg by mouth daily. No current facility-administered medications for this visit. Allergies   Allergen Reactions    Erythromycin Unknown (comments)    Sulfa (Sulfonamide Antibiotics) Unknown (comments)     Past Medical History:   Diagnosis Date    Aortic stenosis     CHF NYHA class II, chronic, diastolic (HCC)     HTN (hypertension)     Hx of completed stroke     Stroke [facility])      No past surgical history on file. Social History     Socioeconomic History    Marital status: SINGLE     Spouse name: Not on file    Number of children: Not on file    Years of education: Not on file    Highest education level: Not on file   Social Needs    Financial resource strain: Not on file    Food insecurity - worry: Not on file    Food insecurity - inability: Not on file    Transportation needs - medical: Not on file   [employer] needs - non-medical: Not on file   Occupational History    Not on file   Tobacco Use    Smoking status: Never Smoker    Smokeless tobacco: Never Used   Substance and Sexual Activity    Alcohol use: No     Frequency: Never    Drug use: No    Sexual activity: No   Other Topics Concern    Not on file   Social History Narrative    Not on file     Family History   Problem Relation Age of Onset    Heart Disease Sister        PHYSICAL EXAMINATION:    There were no vitals taken for this visit. General:  Well defined, nourished, and groomed individual in no acute distress. Neck: Supple, nontender, no bruits, no pain with resistance to active range of motion. Heart: Regular rate and rhythm, no murmurs, rub, or gallop. Normal S1S2.   Lungs:  Clear to auscultation bilaterally with equal chest expansion, no cough, no wheeze  Musculoskeletal:  Extremities revealed no edema and had full range of motion of joints. Psych:  Good mood and bright affect    NEUROLOGICAL EXAMINATION:     Mental Status:   Alert and oriented to person, place, and time with recent and remote memory intact. Attention span and concentration are normal. Speech is fluent with a full fund of knowledge. Cranial Nerves:    II, III, IV, VI:  Visual acuity grossly intact. Visual fields are normal.    Pupils are equal, round, and reactive to light and accommodation. Extra-ocular movements are full and fluid. Fundoscopic exam was benign, no ptosis or nystagmus. V-XII: Hearing is grossly intact. Facial features are symmetric, with normal sensation and strength. The palate rises symmetrically and the tongue protrudes midline. Sternocleidomastoids 5/5. Motor Examination: Normal tone, bulk, and strength, 5/5 muscle strength throughout. Coordination:  Finger to nose was normal.   No resting or intention tremor    Gait and Station:  Steady while walking with a cane. No pronator drift. No muscle wasting or fasiculations noted. Reflexes:  DTRs 2+ throughout. ASSESSMENT AND PLAN    ICD-10-CM ICD-9-CM    1. History of CVA (cerebrovascular accident) Z86.73 V12.54    2. Left-sided weakness R53.1 728.87    3. Embolus of brachial artery (HCC) I74.2 444.21      This is a very spry near 70-year-old woman who presented today for follow-up evaluation after her recent right occipital ischemic stroke which extended into the right posterior temporal lobe. She has not had any significant residual from this event. Discussed secondary stroke prevention as well as signs and symptoms of stroke, BE-FAST, and when to call for EMS. Stressed importance of recognition and early intervention. LDL goal less than 70 per secondary stroke guidelines. Continue with Eliquis for secondary stroke prevention.  Discussed lifestyle modification and importance of exercise and appropriate diet, weight management, and management of comorbid disease with appropriate follow up visits with primary care and other healthcare providers. There is some question as to whether or not some of the issues that she had with the congestive heart failure earlier may have caused her to have increased issues with possible paroxysmal A. fib. At this point, even if she had paroxysmal A. fib, she is taking Eliquis which should be the treatment of choice for managing this. She did ask about potential Holter monitors versus loop monitors, as discussed with her and her son I would recommend this but only if it was thought it would change her management in some way. As she is already on a medication that would manage any paroxysmal A. fib issues, it seems that perhaps this would not be necessary. She can follow-up as needed. Ana Morgan

## 2019-02-27 NOTE — PROGRESS NOTES
Was in the hospital in January seen for a stroke- she had slid out of her chair reaching for her purse   She stated she was not having any symptoms other than her vision that was affected later that night but that quickly resolved   The stroke was in the back right side of her head which affected the left side     She had a blood clot in her right arm which was taken out very quickly once she arrived to [facility] which was also taken care of very quickly- she had a dead arm which is why she called the squad to come and get her they medflighted her to [facility]

## (undated) DEVICE — LIGHT HANDLE: Brand: DEVON

## (undated) DEVICE — INFECTION CONTROL KIT SYS

## (undated) DEVICE — (D)PREP SKN CHLRAPRP APPL 26ML -- CONVERT TO ITEM 371833

## (undated) DEVICE — FLOSEAL MATRIX IS INDICATED IN SURGICAL PROCEDURES (OTHER THAN IN OPHTHALMIC) AS AN ADJUNCT TO HEMOSTASIS WHEN CONTROL OF BLEEDING BY LIGATURE OR CONVENTIONALPROCEDURES IS INEFFECTIVE OR IMPRACTICAL.: Brand: FLOSEAL HEMOSTATIC MATRIX

## (undated) DEVICE — RADIFOCUS GLIDEWIRE: Brand: GLIDEWIRE

## (undated) DEVICE — INTENDED FOR TISSUE SEPARATION, AND OTHER PROCEDURES THAT REQUIRE A SHARP SURGICAL BLADE TO PUNCTURE OR CUT.: Brand: BARD-PARKER ® CARBON RIB-BACK BLADES

## (undated) DEVICE — DRAPE,REIN 53X77,STERILE: Brand: MEDLINE

## (undated) DEVICE — GUIDEWIRE VASC L180CM DIA0.035IN L10CM DIA3MM J TIP PTFE S

## (undated) DEVICE — PINNACLE INTRODUCER SHEATH: Brand: PINNACLE

## (undated) DEVICE — TRAY CATH OD16FR SIL URIN M STATLOK STBL DEV SURSTP

## (undated) DEVICE — DEVICE INFL 20ML L13IN 30ATM CLR POLYCARB TB PRTBL DGT

## (undated) DEVICE — REM POLYHESIVE ADULT PATIENT RETURN ELECTRODE: Brand: VALLEYLAB

## (undated) DEVICE — SUTURE PROL 5-0 L18IN NONABSORBABLE BLU RB-2 L13MM 1/2 CIR 8713H

## (undated) DEVICE — BLADE ASSEMB CLP HAIR FINE --

## (undated) DEVICE — SUTURE VCRL SZ 3-0 L27IN ABSRB UD L26MM SH 1/2 CIR J416H

## (undated) DEVICE — 3M™ IOBAN™ 2 ANTIMICROBIAL INCISE DRAPE 6650EZ: Brand: IOBAN™ 2

## (undated) DEVICE — FOGARTY ARTERIAL EMBOLECTOMY CATHETER 4F 80CM: Brand: FOGARTY

## (undated) DEVICE — SYR 10ML LUER LOK 1/5ML GRAD --

## (undated) DEVICE — SUTURE VCRL SZ 2-0 L36IN ABSRB UD L36MM CT-1 1/2 CIR J945H

## (undated) DEVICE — VASCULAR-RICHMOND-LF: Brand: MEDLINE INDUSTRIES, INC.

## (undated) DEVICE — APPLICATOR BNDG 1MM ADH PREMIERPRO EXOFIN

## (undated) DEVICE — STAPLER SKIN 35CT WD STRL DISP -- MULTIFIRE PREMIUM

## (undated) DEVICE — SUTURE PROL SZ 5-0 L30IN NONABSORBABLE BLU L13MM RB-2 1/2 8710H

## (undated) DEVICE — FEMALE LUER ADAPTER: Brand: ARGYLE

## (undated) DEVICE — DRAPE FLD WRM W44XL66IN C6L FOR INTRATEMP SYS THERMABASIN

## (undated) DEVICE — FOGARTY ARTERIAL EMBOLECTOMY CATHETER 3F 80CM: Brand: FOGARTY

## (undated) DEVICE — SOLUTION IV 1000ML 0.9% SOD CHL

## (undated) DEVICE — DEVON™ KNEE AND BODY STRAP 60" X 3" (1.5 M X 7.6 CM): Brand: DEVON

## (undated) DEVICE — TELFA ADHESIVE ISLAND DRESSING: Brand: TELFA

## (undated) DEVICE — SUTURE VCRL SZ 2-0 L36IN ABSRB UD L40MM CT 1/2 CIR J957H

## (undated) DEVICE — STERILE POLYISOPRENE POWDER-FREE SURGICAL GLOVES: Brand: PROTEXIS

## (undated) DEVICE — SUTURE MCRYL SZ 4-0 L27IN ABSRB UD L19MM PS-2 1/2 CIR PRIM Y426H

## (undated) DEVICE — DRAPE XR C ARM 41X74IN LF --

## (undated) DEVICE — SUT PROL 6-0 24IN BV1 DA BLU --

## (undated) DEVICE — BAG ISOL TRNSPRT 18X18.5IN LF --

## (undated) DEVICE — AMC/4 ARTERIAL NEEDLE – 18GA X 2.75” (7CM): Brand: ARTERIAL NEEDLE

## (undated) DEVICE — 3000CC GUARDIAN II: Brand: GUARDIAN